# Patient Record
Sex: FEMALE | Race: WHITE | NOT HISPANIC OR LATINO | URBAN - METROPOLITAN AREA
[De-identification: names, ages, dates, MRNs, and addresses within clinical notes are randomized per-mention and may not be internally consistent; named-entity substitution may affect disease eponyms.]

---

## 2017-06-04 ENCOUNTER — EMERGENCY (EMERGENCY)
Facility: HOSPITAL | Age: 32
LOS: 1 days | Discharge: HOME | End: 2017-06-04
Attending: EMERGENCY MEDICINE
Payer: COMMERCIAL

## 2017-06-04 VITALS
SYSTOLIC BLOOD PRESSURE: 114 MMHG | HEIGHT: 63 IN | DIASTOLIC BLOOD PRESSURE: 73 MMHG | RESPIRATION RATE: 16 BRPM | TEMPERATURE: 98 F | HEART RATE: 86 BPM | OXYGEN SATURATION: 100 % | WEIGHT: 149.91 LBS

## 2017-06-04 DIAGNOSIS — T42.4X2A POISONING BY BENZODIAZEPINES, INTENTIONAL SELF-HARM, INITIAL ENCOUNTER: ICD-10-CM

## 2017-06-04 DIAGNOSIS — F43.10 POST-TRAUMATIC STRESS DISORDER, UNSPECIFIED: ICD-10-CM

## 2017-06-04 PROCEDURE — 99284 EMERGENCY DEPT VISIT MOD MDM: CPT | Mod: 25

## 2017-06-04 PROCEDURE — 93010 ELECTROCARDIOGRAM REPORT: CPT

## 2017-06-04 PROCEDURE — 90792 PSYCH DIAG EVAL W/MED SRVCS: CPT | Mod: GT

## 2017-06-04 NOTE — ED PROVIDER NOTE - OBJECTIVE STATEMENT
30 y/o F with hx of lupus, scleroderma, and PTSD presenting to the ED complaining of overdose. She states that recently started therapy for her PTSD and has been reliving a lot of traumatic memories from her childhood. For the past 3 weeks of treatment, pt was reliving the traumatic memories at night. Pt would then be able to go to sleep and not think about traumatic past. Today, pt started to have recollections of the traumatic memories during the day. Pt then "did not want to think about them anymore" so she took 0.5 Clonopin x15 so she could sleep. Pt then woke up and told her friend that she took 15 pills and friend became concerned. Friend then came over to make sure pt was okay around 2000 today. Around 2100, pt then started to have rhinorrhea secondary to allergies so she took benadryl x 2. Pt then started to feel drowsy and EMS was called.     Medication: colonipine  (0.5mg)   Percocet itchy 32 y/o F with hx of lupus, scleroderma, and PTSD presenting to the ED complaining of overdose. She states that recently started therapy for her PTSD and has been reliving a lot of traumatic memories from her childhood. For the past 3 weeks of treatment, pt was reliving the traumatic memories at night. Pt would then be able to go to sleep and not think about traumatic past. Today, pt started to have recollections of the traumatic memories during the day. Pt then "did not want to think about them anymore" so she took 0.5 Clonopin x15 so she could sleep around 1300. Pt then woke up and told her friend that she took 15 pills and friend became concerned. Friend then came over to make sure pt was okay around 2000 today. Around 2100, pt then started to have rhinorrhea secondary to allergies so she took benadryl x 2. Pt then started to feel drowsy and EMS was called.     Medication: colonipine  (0.5mg)   Percocet itchy

## 2017-06-04 NOTE — ED PROVIDER NOTE - NS ED MD SCRIBE ATTENDING SCRIBE SECTIONS
INTAKE ASSESSMENT/SCREENINGS/REVIEW OF SYSTEMS/HISTORY OF PRESENT ILLNESS/PAST MEDICAL/SURGICAL/SOCIAL HISTORY/DISPOSITION/HIV/VITAL SIGNS( Pullset)/PHYSICAL EXAM

## 2017-06-04 NOTE — ED ADULT TRIAGE NOTE - CHIEF COMPLAINT QUOTE
As per EMS pt took 15 pills of Klonopin. Pt awake and alert. empty pill bottle filled 5/24/2017. Pt states she has been having more nightmare. pt denies SI or HI, states she just doesn't want to have these memories. Md called to bedside.

## 2017-06-04 NOTE — ED PROVIDER NOTE - CARE PLAN
Principal Discharge DX:	Drug overdose, intentional Principal Discharge DX:	Drug overdose, intentional  Secondary Diagnosis:	PTSD (post-traumatic stress disorder)

## 2017-06-05 VITALS
TEMPERATURE: 98 F | DIASTOLIC BLOOD PRESSURE: 79 MMHG | SYSTOLIC BLOOD PRESSURE: 129 MMHG | HEART RATE: 91 BPM | RESPIRATION RATE: 20 BRPM | OXYGEN SATURATION: 97 %

## 2017-06-05 DIAGNOSIS — R69 ILLNESS, UNSPECIFIED: ICD-10-CM

## 2017-06-05 DIAGNOSIS — F43.10 POST-TRAUMATIC STRESS DISORDER, UNSPECIFIED: ICD-10-CM

## 2017-06-05 DIAGNOSIS — T42.4X4A POISONING BY BENZODIAZEPINES, UNDETERMINED, INITIAL ENCOUNTER: ICD-10-CM

## 2017-06-05 LAB
ALBUMIN SERPL ELPH-MCNC: 4.2 G/DL — SIGNIFICANT CHANGE UP (ref 3.3–5.2)
ALP SERPL-CCNC: 59 U/L — SIGNIFICANT CHANGE UP (ref 40–120)
ALT FLD-CCNC: 8 U/L — SIGNIFICANT CHANGE UP
AMPHET UR-MCNC: POSITIVE
ANION GAP SERPL CALC-SCNC: 11 MMOL/L — SIGNIFICANT CHANGE UP (ref 5–17)
APAP SERPL-MCNC: <7.5 UG/ML — LOW (ref 10–26)
APPEARANCE UR: ABNORMAL
AST SERPL-CCNC: 12 U/L — SIGNIFICANT CHANGE UP
BACTERIA # UR AUTO: ABNORMAL
BARBITURATES UR SCN-MCNC: NEGATIVE — SIGNIFICANT CHANGE UP
BASOPHILS # BLD AUTO: 0 K/UL — SIGNIFICANT CHANGE UP (ref 0–0.2)
BASOPHILS NFR BLD AUTO: 0.4 % — SIGNIFICANT CHANGE UP (ref 0–2)
BENZODIAZ UR-MCNC: POSITIVE
BILIRUB SERPL-MCNC: 0.2 MG/DL — LOW (ref 0.4–2)
BILIRUB UR-MCNC: NEGATIVE — SIGNIFICANT CHANGE UP
BUN SERPL-MCNC: 10 MG/DL — SIGNIFICANT CHANGE UP (ref 8–20)
CALCIUM SERPL-MCNC: 9 MG/DL — SIGNIFICANT CHANGE UP (ref 8.6–10.2)
CHLORIDE SERPL-SCNC: 102 MMOL/L — SIGNIFICANT CHANGE UP (ref 98–107)
CO2 SERPL-SCNC: 28 MMOL/L — SIGNIFICANT CHANGE UP (ref 22–29)
COCAINE METAB.OTHER UR-MCNC: NEGATIVE — SIGNIFICANT CHANGE UP
COLOR SPEC: YELLOW — SIGNIFICANT CHANGE UP
CREAT SERPL-MCNC: 0.73 MG/DL — SIGNIFICANT CHANGE UP (ref 0.5–1.3)
DIFF PNL FLD: ABNORMAL
EOSINOPHIL # BLD AUTO: 0.5 K/UL — SIGNIFICANT CHANGE UP (ref 0–0.5)
EOSINOPHIL NFR BLD AUTO: 8.1 % — HIGH (ref 0–6)
EPI CELLS # UR: ABNORMAL
ETHANOL SERPL-MCNC: <10 MG/DL — SIGNIFICANT CHANGE UP
GLUCOSE SERPL-MCNC: 86 MG/DL — SIGNIFICANT CHANGE UP (ref 70–115)
GLUCOSE UR QL: NEGATIVE MG/DL — SIGNIFICANT CHANGE UP
HCG UR QL: NEGATIVE — SIGNIFICANT CHANGE UP
HCT VFR BLD CALC: 40 % — SIGNIFICANT CHANGE UP (ref 37–47)
HGB BLD-MCNC: 12.9 G/DL — SIGNIFICANT CHANGE UP (ref 12–16)
KETONES UR-MCNC: NEGATIVE — SIGNIFICANT CHANGE UP
LEUKOCYTE ESTERASE UR-ACNC: ABNORMAL
LYMPHOCYTES # BLD AUTO: 2 K/UL — SIGNIFICANT CHANGE UP (ref 1–4.8)
LYMPHOCYTES # BLD AUTO: 30.2 % — SIGNIFICANT CHANGE UP (ref 20–55)
MCHC RBC-ENTMCNC: 26.1 PG — LOW (ref 27–31)
MCHC RBC-ENTMCNC: 32.3 G/DL — SIGNIFICANT CHANGE UP (ref 32–36)
MCV RBC AUTO: 80.8 FL — LOW (ref 81–99)
METHADONE UR-MCNC: NEGATIVE — SIGNIFICANT CHANGE UP
MONOCYTES # BLD AUTO: 0.6 K/UL — SIGNIFICANT CHANGE UP (ref 0–0.8)
MONOCYTES NFR BLD AUTO: 9.4 % — SIGNIFICANT CHANGE UP (ref 3–10)
NEUTROPHILS # BLD AUTO: 3.4 K/UL — SIGNIFICANT CHANGE UP (ref 1.8–8)
NEUTROPHILS NFR BLD AUTO: 51.6 % — SIGNIFICANT CHANGE UP (ref 37–73)
NITRITE UR-MCNC: NEGATIVE — SIGNIFICANT CHANGE UP
OPIATES UR-MCNC: NEGATIVE — SIGNIFICANT CHANGE UP
PCP SPEC-MCNC: SIGNIFICANT CHANGE UP
PCP UR-MCNC: NEGATIVE — SIGNIFICANT CHANGE UP
PH UR: 5 — SIGNIFICANT CHANGE UP (ref 5–8)
PLATELET # BLD AUTO: 272 K/UL — SIGNIFICANT CHANGE UP (ref 150–400)
POTASSIUM SERPL-MCNC: 3.8 MMOL/L — SIGNIFICANT CHANGE UP (ref 3.5–5.3)
POTASSIUM SERPL-SCNC: 3.8 MMOL/L — SIGNIFICANT CHANGE UP (ref 3.5–5.3)
PROT SERPL-MCNC: 7.4 G/DL — SIGNIFICANT CHANGE UP (ref 6.6–8.7)
PROT UR-MCNC: NEGATIVE MG/DL — SIGNIFICANT CHANGE UP
RBC # BLD: 4.95 M/UL — SIGNIFICANT CHANGE UP (ref 4.4–5.2)
RBC # FLD: 14.1 % — SIGNIFICANT CHANGE UP (ref 11–15.6)
RBC CASTS # UR COMP ASSIST: ABNORMAL /HPF (ref 0–4)
SALICYLATES SERPL-MCNC: <2 MG/DL — LOW (ref 10–20)
SODIUM SERPL-SCNC: 141 MMOL/L — SIGNIFICANT CHANGE UP (ref 135–145)
SP GR SPEC: 1.02 — SIGNIFICANT CHANGE UP (ref 1.01–1.02)
THC UR QL: NEGATIVE — SIGNIFICANT CHANGE UP
UROBILINOGEN FLD QL: NEGATIVE MG/DL — SIGNIFICANT CHANGE UP
WBC # BLD: 6.7 K/UL — SIGNIFICANT CHANGE UP (ref 4.8–10.8)
WBC # FLD AUTO: 6.7 K/UL — SIGNIFICANT CHANGE UP (ref 4.8–10.8)
WBC UR QL: SIGNIFICANT CHANGE UP

## 2017-06-05 PROCEDURE — 99285 EMERGENCY DEPT VISIT HI MDM: CPT

## 2017-06-05 PROCEDURE — 93005 ELECTROCARDIOGRAM TRACING: CPT

## 2017-06-05 PROCEDURE — 81025 URINE PREGNANCY TEST: CPT

## 2017-06-05 PROCEDURE — 85027 COMPLETE CBC AUTOMATED: CPT

## 2017-06-05 PROCEDURE — 80307 DRUG TEST PRSMV CHEM ANLYZR: CPT

## 2017-06-05 PROCEDURE — 80053 COMPREHEN METABOLIC PANEL: CPT

## 2017-06-05 PROCEDURE — 81001 URINALYSIS AUTO W/SCOPE: CPT

## 2017-06-05 NOTE — ED ADULT NURSE REASSESSMENT NOTE - GENERAL PATIENT STATE
comfortable appearance
resting/sleeping/comfortable appearance
comfortable appearance/resting/sleeping/cooperative
cooperative/comfortable appearance/resting/sleeping
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
## 2017-06-05 NOTE — ED BEHAVIORAL HEALTH ASSESSMENT NOTE - OTHER PAST PSYCHIATRIC HISTORY (INCLUDE DETAILS REGARDING ONSET, COURSE OF ILLNESS, INPATIENT/OUTPATIENT TREATMENT)
On adderall x 10 years for ADHD, prescribed by neurologist initially.   Began having symptoms post-traumatic stress disorder two years ago, began with panic attacks.  Two hospitalizations: February 2016 at [facility] for cutting, Second hospitalization December 2016 after texting  suicidal statement in context of feeling overwhelmed with business during Christmas season. States at the time "snapped" and felt like she "just wanted to end it," but would not have done anything and just wanted her  to "realize how stressful this is for [her]." Reports hospitalization was "worst experience of [her] life."   Outpatient treatment with Dr. Cole from [facility], psychiatrist, once a month. Recently started EMDR treatment one month ago with Dr. Miller who reports saw her three times (two sessions focused on coping skills, third session just history, reports has not started the processing yet).   No history of violence or suicide attempts. History of NSSI as above.
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
## 2017-06-05 NOTE — ED BEHAVIORAL HEALTH ASSESSMENT NOTE - DESCRIPTION
Patient reported taking klonopin 0.5mg x 15 and two tablets of benadryl at 21:00. She arrived to ED around 23:38. Of note, patient also arrived with empty bottle of klonopin (received Rx for 90 tablets on 5/24/17). Vital signs were stable. She was described as aware and alert on physical examination. EKG was wnl. Labs significant for utox positive for benzos and amphetamine. Patient was medically cleared. Patient was calm and there were no behavioral incidents. On evaluation, patient was quite drowsy, somewhat slurred speech, talking slowly. Became angry when discussed possible inpatient admission though agreed to plan to hold in ED for morning until can reach outpatient psychiatrist and get reevaluated once medication fully clears. lupus, scleroderma lives with  and two boys ages 5 and 6. Runs a furniture business.

## 2017-06-05 NOTE — ED BEHAVIORAL HEALTH ASSESSMENT NOTE - SUICIDE PROTECTIVE FACTORS
Responsibility to family and others/Engaged in work or school/Positive therapeutic relationships/Supportive social network or family

## 2017-06-05 NOTE — ED ADULT NURSE NOTE - CHPI ED SYMPTOMS NEG
no agitation/no hallucinations/no change in level of consciousness/no weakness/no confusion/no suicidal/no disorientation/no weight loss/no paranoia/no homicidal
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
## 2017-06-05 NOTE — ED BEHAVIORAL HEALTH ASSESSMENT NOTE - DIFFERENTIAL
Benzodiazepine overdose of undetermined intent  post-traumatic stress disorder  Depressive disorder, unspecified  Benzodiazepine use disorder  Borderline personality disorder
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
## 2017-06-05 NOTE — ED ADULT NURSE REASSESSMENT NOTE - NS ED NURSE REASSESS COMMENT FT1
Currently being telepsyched
Patient pleasant on interaction, complaint of feeling tired, no attempts to harm self or others, denies suicidal or homicidal ideation, and safety maintained.  Patient offered breakfast but declined.
pt currently resting tele psych called.
pt currently denies SI/HI denies A/V/T hallucinations.  Pt was tele psyched, patient refusing to be inpatient, tele psychiatrist wants her to be reevaluated in am after they receive collateral from patients psychiatrist.  Awaiting Dispo in AM.  Will continue to monitor and maintain safety.
pt currently resting, in no acute distress at this time, in yellow gowns.  Tele psych will be notified once all lab results are available.  Will continue to monitor and maintain safety.
Assumed care at 0717, resting in bed, cooperative with vital signs assessment, awaiting psychiatric reassessment, and safety maintained.
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
## 2017-12-13 ENCOUNTER — EMERGENCY (EMERGENCY)
Facility: HOSPITAL | Age: 32
LOS: 1 days | Discharge: HOME | End: 2017-12-13
Attending: EMERGENCY MEDICINE
Payer: COMMERCIAL

## 2017-12-13 VITALS
HEART RATE: 90 BPM | TEMPERATURE: 98 F | WEIGHT: 125 LBS | OXYGEN SATURATION: 100 % | SYSTOLIC BLOOD PRESSURE: 112 MMHG | HEIGHT: 63 IN | DIASTOLIC BLOOD PRESSURE: 67 MMHG | RESPIRATION RATE: 18 BRPM

## 2017-12-13 LAB
ALBUMIN SERPL ELPH-MCNC: 4.7 G/DL — SIGNIFICANT CHANGE UP (ref 3.3–5.2)
ALP SERPL-CCNC: 56 U/L — SIGNIFICANT CHANGE UP (ref 40–120)
ALT FLD-CCNC: 9 U/L — SIGNIFICANT CHANGE UP
ANION GAP SERPL CALC-SCNC: 13 MMOL/L — SIGNIFICANT CHANGE UP (ref 5–17)
APAP SERPL-MCNC: <7.5 UG/ML — LOW (ref 10–26)
AST SERPL-CCNC: 14 U/L — SIGNIFICANT CHANGE UP
BASOPHILS # BLD AUTO: 0 K/UL — SIGNIFICANT CHANGE UP (ref 0–0.2)
BASOPHILS NFR BLD AUTO: 0.4 % — SIGNIFICANT CHANGE UP (ref 0–2)
BILIRUB SERPL-MCNC: 0.5 MG/DL — SIGNIFICANT CHANGE UP (ref 0.4–2)
BUN SERPL-MCNC: 13 MG/DL — SIGNIFICANT CHANGE UP (ref 8–20)
CALCIUM SERPL-MCNC: 9.1 MG/DL — SIGNIFICANT CHANGE UP (ref 8.6–10.2)
CHLORIDE SERPL-SCNC: 100 MMOL/L — SIGNIFICANT CHANGE UP (ref 98–107)
CO2 SERPL-SCNC: 25 MMOL/L — SIGNIFICANT CHANGE UP (ref 22–29)
CREAT SERPL-MCNC: 0.65 MG/DL — SIGNIFICANT CHANGE UP (ref 0.5–1.3)
EOSINOPHIL # BLD AUTO: 0.2 K/UL — SIGNIFICANT CHANGE UP (ref 0–0.5)
EOSINOPHIL NFR BLD AUTO: 2.8 % — SIGNIFICANT CHANGE UP (ref 0–6)
ETHANOL SERPL-MCNC: <10 MG/DL — SIGNIFICANT CHANGE UP
GLUCOSE SERPL-MCNC: 85 MG/DL — SIGNIFICANT CHANGE UP (ref 70–115)
HCG SERPL-ACNC: <2 MIU/ML — SIGNIFICANT CHANGE UP
HCT VFR BLD CALC: 40.6 % — SIGNIFICANT CHANGE UP (ref 37–47)
HGB BLD-MCNC: 13.3 G/DL — SIGNIFICANT CHANGE UP (ref 12–16)
LYMPHOCYTES # BLD AUTO: 2.2 K/UL — SIGNIFICANT CHANGE UP (ref 1–4.8)
LYMPHOCYTES # BLD AUTO: 29.5 % — SIGNIFICANT CHANGE UP (ref 20–55)
MCHC RBC-ENTMCNC: 25.6 PG — LOW (ref 27–31)
MCHC RBC-ENTMCNC: 32.8 G/DL — SIGNIFICANT CHANGE UP (ref 32–36)
MCV RBC AUTO: 78.2 FL — LOW (ref 81–99)
MONOCYTES # BLD AUTO: 0.5 K/UL — SIGNIFICANT CHANGE UP (ref 0–0.8)
MONOCYTES NFR BLD AUTO: 6.7 % — SIGNIFICANT CHANGE UP (ref 3–10)
NEUTROPHILS # BLD AUTO: 4.6 K/UL — SIGNIFICANT CHANGE UP (ref 1.8–8)
NEUTROPHILS NFR BLD AUTO: 60.5 % — SIGNIFICANT CHANGE UP (ref 37–73)
PCP SPEC-MCNC: SIGNIFICANT CHANGE UP
PLATELET # BLD AUTO: 249 K/UL — SIGNIFICANT CHANGE UP (ref 150–400)
POTASSIUM SERPL-MCNC: 3.9 MMOL/L — SIGNIFICANT CHANGE UP (ref 3.5–5.3)
POTASSIUM SERPL-SCNC: 3.9 MMOL/L — SIGNIFICANT CHANGE UP (ref 3.5–5.3)
PROT SERPL-MCNC: 8.3 G/DL — SIGNIFICANT CHANGE UP (ref 6.6–8.7)
RBC # BLD: 5.19 M/UL — SIGNIFICANT CHANGE UP (ref 4.4–5.2)
RBC # FLD: 13.4 % — SIGNIFICANT CHANGE UP (ref 11–15.6)
SALICYLATES SERPL-MCNC: <2 MG/DL — LOW (ref 10–20)
SODIUM SERPL-SCNC: 138 MMOL/L — SIGNIFICANT CHANGE UP (ref 135–145)
WBC # BLD: 7.6 K/UL — SIGNIFICANT CHANGE UP (ref 4.8–10.8)
WBC # FLD AUTO: 7.6 K/UL — SIGNIFICANT CHANGE UP (ref 4.8–10.8)

## 2017-12-13 PROCEDURE — 80053 COMPREHEN METABOLIC PANEL: CPT

## 2017-12-13 PROCEDURE — 36415 COLL VENOUS BLD VENIPUNCTURE: CPT

## 2017-12-13 PROCEDURE — 99285 EMERGENCY DEPT VISIT HI MDM: CPT | Mod: 25

## 2017-12-13 PROCEDURE — 80307 DRUG TEST PRSMV CHEM ANLYZR: CPT

## 2017-12-13 PROCEDURE — 84702 CHORIONIC GONADOTROPIN TEST: CPT

## 2017-12-13 PROCEDURE — 93005 ELECTROCARDIOGRAM TRACING: CPT

## 2017-12-13 PROCEDURE — 93010 ELECTROCARDIOGRAM REPORT: CPT

## 2017-12-13 PROCEDURE — 99284 EMERGENCY DEPT VISIT MOD MDM: CPT

## 2017-12-13 PROCEDURE — 85027 COMPLETE CBC AUTOMATED: CPT

## 2017-12-13 RX ORDER — SODIUM CHLORIDE 9 MG/ML
2000 INJECTION INTRAMUSCULAR; INTRAVENOUS; SUBCUTANEOUS ONCE
Qty: 0 | Refills: 0 | Status: COMPLETED | OUTPATIENT
Start: 2017-12-13 | End: 2017-12-13

## 2017-12-13 RX ADMIN — SODIUM CHLORIDE 2000 MILLILITER(S): 9 INJECTION INTRAMUSCULAR; INTRAVENOUS; SUBCUTANEOUS at 22:28

## 2017-12-13 NOTE — ED PROVIDER NOTE - CHPI ED SYMPTOMS NEG
no vomiting/no chills/no abdominal distension/no abdominal pain/no pain/no confusion/no nausea/no weakness/no disorientation/no fever

## 2017-12-13 NOTE — ED PROVIDER NOTE - SKIN, MLM
Skin normal color for race, warm, dry and intact. No evidence of rash, Multiple old healing scars of different stages of healing in B/L wrist. NVI
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
## 2017-12-13 NOTE — ED ADULT NURSE NOTE - OBJECTIVE STATEMENT
pt care assumed at 2220, no apparent distress noted at this time. pt received in yellow gown, Alert and Oriented to person, place, situation and time sitting up in bed upset with  at bedside. Pt states she has h/o PTSD and felt an attack coming on when she decided to take extra Klonopin to prevent getting a "full blown attack." pts usuald daily dose is 3 klonipin per day but she took 7 today. pt states she has taken extra in the past, approved by her therapist. pt states she couldn't walk home from [employer] so she asked for a ride home and that is when her  called an ambulance. Pt states she has a lot of stress at home at this time and the "sad music at [employer] triggered this attack." Pt denies SI or HI. pt has healing lacerations on b/l wrists. Pt states she "cuts herself as a coping mechanism, not as a means to harm herself and her therapist is fully aware." HR is regular, lung sounds are clear b/l, abd is soft and nontender with positive bowel sounds in all four quadrants, skin is warm, dry and appropriate for age and race. plan of care explained, will continue to monitor.

## 2017-12-13 NOTE — ED PROVIDER NOTE - OBJECTIVE STATEMENT
The patient is a 32 year old female presents with taking total of 7 pills of klonopin 0.5 mg for her PTSD.  The patient has long standing PTSD from her childhood and occasionally have nightmares which resulting her to have anxiety attacks.  The patient denies SI nor HI.  No HA, No CP, No ABD Pain.  The patient is mild sleepy but has no respiratory distress.

## 2017-12-13 NOTE — ED ADULT NURSE NOTE - CHIEF COMPLAINT QUOTE
I took 7 Klonopin 0.5mg. Pt states she normally take 3 Klonopin for PTSD. Pt denies SI or HI. noted old healing cuts on right forearm. pt states she does not want to be here and  called the ambulance. Pt belongings removed and placed in locker. `

## 2017-12-13 NOTE — ED ADULT TRIAGE NOTE - CHIEF COMPLAINT QUOTE
I took 7 Klonopin 0.5mg. Pt states she normally take 3 Klonopin for PTSD. Pt denies SI or HI. pt states she does not want to be here and  called the ambulance. Pt belongings removed and placed in locker. ` I took 7 Klonopin 0.5mg. Pt states she normally take 3 Klonopin for PTSD. Pt denies SI or HI. noted old healing cuts on right forearm. pt states she does not want to be here and  called the ambulance. Pt belongings removed and placed in locker. `

## 2017-12-14 LAB
AMPHET UR-MCNC: POSITIVE
BARBITURATES UR SCN-MCNC: NEGATIVE — SIGNIFICANT CHANGE UP
BENZODIAZ UR-MCNC: POSITIVE
COCAINE METAB.OTHER UR-MCNC: NEGATIVE — SIGNIFICANT CHANGE UP
METHADONE UR-MCNC: NEGATIVE — SIGNIFICANT CHANGE UP
OPIATES UR-MCNC: NEGATIVE — SIGNIFICANT CHANGE UP
PCP UR-MCNC: NEGATIVE — SIGNIFICANT CHANGE UP
THC UR QL: NEGATIVE — SIGNIFICANT CHANGE UP

## 2017-12-14 PROCEDURE — 90792 PSYCH DIAG EVAL W/MED SRVCS: CPT | Mod: GT
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
## 2017-12-14 NOTE — ED BEHAVIORAL HEALTH ASSESSMENT NOTE - DESCRIPTION
lupus, scleroderma lives with  and two boys ages 5 and 6. Runs a furniture business. Pt is  calm and cooperative, no restraints or PRN utilized, no issues reported.

## 2017-12-14 NOTE — ED BEHAVIORAL HEALTH ASSESSMENT NOTE - SUICIDE PROTECTIVE FACTORS
Engaged in work or school/Positive therapeutic relationships/Responsibility to family and others/Supportive social network or family Responsibility to family and others/Identifies reasons for living/Future oriented/Engaged in work or school/Supportive social network or family/Positive therapeutic relationships

## 2017-12-14 NOTE — ED BEHAVIORAL HEALTH ASSESSMENT NOTE - CURRENT MEDICATION
Klonopin 0.5mg po TID - last Rx 5/24/17 given 90 tablets  Adderall 30mg po daily - last Rx 5/24/17 Adderall XR 30mg po daily - last Rx 12/1/17 (Austin Reyes MD)  Adderall XR 5 mg PO daily - last rx 12/1/17 (Austin Reyes MD)  Klonopin 0.5mg po TID - last Rx 9/10/17 given 90 tablets (Nikia Chu MD) Albuterol nebulizer as needed shortness of breath, Brio inhaler daily, Ibuprofen as needed   Adderall XR 30mg po daily - last Rx 12/1/17 (Austin Reyes MD)  Adderall XR 5 mg PO daily - last rx 12/1/17 (Austin Reyes MD)  Klonopin 0.5mg po TID - last Rx 9/10/17 given 90 tablets (Nikia Chu MD)

## 2017-12-14 NOTE — ED ADULT NURSE REASSESSMENT NOTE - NS ED NURSE REASSESS COMMENT FT1
pt hesitant to go come into , emotional support and reassurance provided, pt tearful and states she has PTSD.  Pt has superficial scratches on both forearms states she has done this throughout the years not to hurt self but to cope with the pain.  Pt. denies A/V/T and denies SI/HI at this present time.  Pt will be telpsyched.  Will continue to monitor and maintains safety.
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
## 2017-12-14 NOTE — ED BEHAVIORAL HEALTH ASSESSMENT NOTE - OTHER PAST PSYCHIATRIC HISTORY (INCLUDE DETAILS REGARDING ONSET, COURSE OF ILLNESS, INPATIENT/OUTPATIENT TREATMENT)
On adderall x 10 years for ADHD, prescribed by neurologist initially.   Began having symptoms post-traumatic stress disorder two years ago, began with panic attacks.  Two hospitalizations: February 2016 at [facility] for cutting, Second hospitalization December 2016 after texting  suicidal statement in context of feeling overwhelmed with business during Christmas season. States at the time "snapped" and felt like she "just wanted to end it," but would not have done anything and just wanted her  to "realize how stressful this is for [her]." Reports hospitalization was "worst experience of [her] life."   Outpatient treatment with Dr. Cole from [facility], psychiatrist, once a month. Recently started EMDR treatment one month ago with Dr. Miller who reports saw her three times (two sessions focused on coping skills, third session just history, reports has not started the processing yet).   No history of violence or suicide attempts. History of NSSI as above. 1 yr ago - [facility] - was held in Emergency Department for 1 1/2 days, hospitalized "against psychiatrist's request"    On adderall x 10 years for ADHD, prescribed by neurologist initially.   Began having symptoms post-traumatic stress disorder two years ago, began with panic attacks.  Two hospitalizations: February 2016 at [facility] for cutting, Second hospitalization December 2016 after texting  suicidal statement in context of feeling overwhelmed with business during [place] season. States at the time "snapped" and felt like she "just wanted to end it," but would not have done anything and just wanted her  to "realize how stressful this is for [her]." Reports hospitalization was "worst experience of [her] life."   Outpatient treatment with Dr. Cole from [facility], psychiatrist, once a month. Recently started EMDR treatment one month ago with Dr. Miller who reports saw her three times (two sessions focused on coping skills, third session just history, reports has not started the processing yet).   No history of violence or suicide attempts. History of NSSI as above. 1 yr ago - [facility] - was held in Emergency Department for 1 1/2 days, hospitalized "against psychiatrist's request"    was seeing Dr. Romero ([place]) for 2 yrs, dropped because missed an appt due to lack of attendance to appts; now seeing Dr. Reyes in [place] - had seen him in the past, neurologist, stopped EMDR placed on hold (Osvaldo David), nearly complete, on maintenance schedule (every other week) planning on restarting after holidays     On adderall x 10 years for ADHD, prescribed by neurologist initially.   Began having symptoms post-traumatic stress disorder two years ago, began with panic attacks.  Two hospitalizations: February 2016 at [facility] for cutting, Second hospitalization December 2016 after texting  suicidal statement in context of feeling overwhelmed with business during [place] season. States at the time "snapped" and felt like she "just wanted to end it," but would not have done anything and just wanted her  to "realize how stressful this is for [her]." Reports hospitalization was "worst experience of [her] life."   Outpatient treatment with Dr. Cole from [facility], psychiatrist, once a month. Recently started EMDR treatment one month ago with Dr. Miller who reports saw her three times (two sessions focused on coping skills, third session just history, reports has not started the processing yet).   No history of violence or suicide attempts. History of NSSI as above. Began having symptoms post-traumatic stress disorder two years ago, began with panic attacks.  Two hospitalizations: February 2016 at [facility] for cutting, Second hospitalization December 2016 after texting  suicidal statement in context of feeling overwhelmed with business during [place] season. 1 yr ago - [facility] - was held in Emergency Department for 1 1/2 days, hospitalized "against psychiatrist's request"; was seeing Dr. Romero ([place]) for 2 yrs, dropped because missed an appt due to lack of attendance to appts; now seeing Dr. Reyes (Neurologist) in [place] - had seen him in the past stopped EMDR placed on hold (Osvaldo David), nearly complete, now on maintenance schedule (every other week) planning on restarting after holidays; no suicide attempt     BStates at the time "snapped" and felt like she "just wanted to end it," but would not have done anything and just wanted her  to "realize how stressful this is for [her]." Reports hospitalization was "worst experience of [her] life."   Outpatient treatment with Dr. Cole from [facility], psychiatrist, once a month. Recently started EMDR treatment one month ago with Dr. Miller who reports saw her three times (two sessions focused on coping skills, third session just history, reports has not started the processing yet).   No history of violence or suicide attempts. History of NSSI as above. Began having symptoms post-traumatic stress disorder two years ago, began with panic attacks.  Two hospitalizations: February 2016 at [facility] for cutting, Second hospitalization December 2016 after texting  suicidal statement in context of feeling overwhelmed with business during [place] season. 1 yr ago - [facility] - was held in Emergency Department for 1 1/2 days, hospitalized "against psychiatrist's request"; was seeing Dr. Romero ([place]) for 2 yrs, dropped because missed an appt due to lack of attendance to appts; now seeing Dr. Reyes (Neurologist) in [place] - had seen him in the past stopped EMDR placed on hold (Osvaldo David), nearly complete, now on maintenance schedule (every other week) planning on restarting after holidays; no suicide attempt
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
## 2018-04-12 ENCOUNTER — TRANSCRIPTION ENCOUNTER (OUTPATIENT)
Age: 33
End: 2018-04-12

## 2018-04-12 ENCOUNTER — RESULT REVIEW (OUTPATIENT)
Age: 33
End: 2018-04-12

## 2018-04-12 ENCOUNTER — INPATIENT (INPATIENT)
Facility: HOSPITAL | Age: 33
LOS: 0 days | Discharge: HOME | DRG: 777 | End: 2018-04-13
Attending: SPECIALIST | Admitting: SPECIALIST
Payer: COMMERCIAL

## 2018-04-12 VITALS — WEIGHT: 154.98 LBS | HEIGHT: 64 IN

## 2018-04-12 DIAGNOSIS — Z98.890 OTHER SPECIFIED POSTPROCEDURAL STATES: Chronic | ICD-10-CM

## 2018-04-12 DIAGNOSIS — Z98.891 HISTORY OF UTERINE SCAR FROM PREVIOUS SURGERY: Chronic | ICD-10-CM

## 2018-04-12 DIAGNOSIS — O00.90 UNSPECIFIED ECTOPIC PREGNANCY WITHOUT INTRAUTERINE PREGNANCY: ICD-10-CM

## 2018-04-12 LAB
ABO RH CONFIRMATION: SIGNIFICANT CHANGE UP
ALBUMIN SERPL ELPH-MCNC: 4.2 G/DL — SIGNIFICANT CHANGE UP (ref 3.3–5.2)
ALP SERPL-CCNC: 60 U/L — SIGNIFICANT CHANGE UP (ref 40–120)
ALT FLD-CCNC: 9 U/L — SIGNIFICANT CHANGE UP
ANION GAP SERPL CALC-SCNC: 12 MMOL/L — SIGNIFICANT CHANGE UP (ref 5–17)
APPEARANCE UR: CLEAR — SIGNIFICANT CHANGE UP
AST SERPL-CCNC: 15 U/L — SIGNIFICANT CHANGE UP
BACTERIA # UR AUTO: ABNORMAL
BASOPHILS # BLD AUTO: 0 K/UL — SIGNIFICANT CHANGE UP (ref 0–0.2)
BASOPHILS NFR BLD AUTO: 0.3 % — SIGNIFICANT CHANGE UP (ref 0–2)
BILIRUB SERPL-MCNC: 0.4 MG/DL — SIGNIFICANT CHANGE UP (ref 0.4–2)
BILIRUB UR-MCNC: NEGATIVE — SIGNIFICANT CHANGE UP
BLD GP AB SCN SERPL QL: SIGNIFICANT CHANGE UP
BUN SERPL-MCNC: 8 MG/DL — SIGNIFICANT CHANGE UP (ref 8–20)
CALCIUM SERPL-MCNC: 9.1 MG/DL — SIGNIFICANT CHANGE UP (ref 8.6–10.2)
CHLORIDE SERPL-SCNC: 101 MMOL/L — SIGNIFICANT CHANGE UP (ref 98–107)
CO2 SERPL-SCNC: 25 MMOL/L — SIGNIFICANT CHANGE UP (ref 22–29)
COLOR SPEC: YELLOW — SIGNIFICANT CHANGE UP
CREAT SERPL-MCNC: 0.57 MG/DL — SIGNIFICANT CHANGE UP (ref 0.5–1.3)
DIFF PNL FLD: ABNORMAL
EOSINOPHIL # BLD AUTO: 0.3 K/UL — SIGNIFICANT CHANGE UP (ref 0–0.5)
EOSINOPHIL NFR BLD AUTO: 4.2 % — SIGNIFICANT CHANGE UP (ref 0–6)
EPI CELLS # UR: SIGNIFICANT CHANGE UP
GLUCOSE SERPL-MCNC: 85 MG/DL — SIGNIFICANT CHANGE UP (ref 70–115)
GLUCOSE UR QL: NEGATIVE MG/DL — SIGNIFICANT CHANGE UP
HCG SERPL-ACNC: 407.9 MIU/ML — SIGNIFICANT CHANGE UP
HCT VFR BLD CALC: 38.7 % — SIGNIFICANT CHANGE UP (ref 37–47)
HGB BLD-MCNC: 12.4 G/DL — SIGNIFICANT CHANGE UP (ref 12–16)
KETONES UR-MCNC: NEGATIVE — SIGNIFICANT CHANGE UP
LEUKOCYTE ESTERASE UR-ACNC: NEGATIVE — SIGNIFICANT CHANGE UP
LYMPHOCYTES # BLD AUTO: 2.1 K/UL — SIGNIFICANT CHANGE UP (ref 1–4.8)
LYMPHOCYTES # BLD AUTO: 31 % — SIGNIFICANT CHANGE UP (ref 20–55)
MCHC RBC-ENTMCNC: 25.2 PG — LOW (ref 27–31)
MCHC RBC-ENTMCNC: 32 G/DL — SIGNIFICANT CHANGE UP (ref 32–36)
MCV RBC AUTO: 78.7 FL — LOW (ref 81–99)
MONOCYTES # BLD AUTO: 0.6 K/UL — SIGNIFICANT CHANGE UP (ref 0–0.8)
MONOCYTES NFR BLD AUTO: 8.7 % — SIGNIFICANT CHANGE UP (ref 3–10)
NEUTROPHILS # BLD AUTO: 3.7 K/UL — SIGNIFICANT CHANGE UP (ref 1.8–8)
NEUTROPHILS NFR BLD AUTO: 55.6 % — SIGNIFICANT CHANGE UP (ref 37–73)
NITRITE UR-MCNC: NEGATIVE — SIGNIFICANT CHANGE UP
PH UR: 6.5 — SIGNIFICANT CHANGE UP (ref 5–8)
PLATELET # BLD AUTO: 233 K/UL — SIGNIFICANT CHANGE UP (ref 150–400)
POTASSIUM SERPL-MCNC: 3.8 MMOL/L — SIGNIFICANT CHANGE UP (ref 3.5–5.3)
POTASSIUM SERPL-SCNC: 3.8 MMOL/L — SIGNIFICANT CHANGE UP (ref 3.5–5.3)
PROT SERPL-MCNC: 7.4 G/DL — SIGNIFICANT CHANGE UP (ref 6.6–8.7)
PROT UR-MCNC: NEGATIVE MG/DL — SIGNIFICANT CHANGE UP
RBC # BLD: 4.92 M/UL — SIGNIFICANT CHANGE UP (ref 4.4–5.2)
RBC # FLD: 13.8 % — SIGNIFICANT CHANGE UP (ref 11–15.6)
RBC CASTS # UR COMP ASSIST: ABNORMAL /HPF (ref 0–4)
SODIUM SERPL-SCNC: 138 MMOL/L — SIGNIFICANT CHANGE UP (ref 135–145)
SP GR SPEC: 1.01 — SIGNIFICANT CHANGE UP (ref 1.01–1.02)
TYPE + AB SCN PNL BLD: SIGNIFICANT CHANGE UP
UROBILINOGEN FLD QL: NEGATIVE MG/DL — SIGNIFICANT CHANGE UP
WBC # BLD: 6.6 K/UL — SIGNIFICANT CHANGE UP (ref 4.8–10.8)
WBC # FLD AUTO: 6.6 K/UL — SIGNIFICANT CHANGE UP (ref 4.8–10.8)
WBC UR QL: SIGNIFICANT CHANGE UP

## 2018-04-12 PROCEDURE — 59151 TREAT ECTOPIC PREGNANCY: CPT

## 2018-04-12 PROCEDURE — 88305 TISSUE EXAM BY PATHOLOGIST: CPT | Mod: 26

## 2018-04-12 PROCEDURE — 76817 TRANSVAGINAL US OBSTETRIC: CPT | Mod: 26

## 2018-04-12 PROCEDURE — 99285 EMERGENCY DEPT VISIT HI MDM: CPT | Mod: 25

## 2018-04-12 PROCEDURE — 76815 OB US LIMITED FETUS(S): CPT | Mod: 26

## 2018-04-12 RX ORDER — SODIUM CHLORIDE 9 MG/ML
1000 INJECTION, SOLUTION INTRAVENOUS
Qty: 0 | Refills: 0 | Status: DISCONTINUED | OUTPATIENT
Start: 2018-04-12 | End: 2018-04-13

## 2018-04-12 RX ORDER — SODIUM CHLORIDE 9 MG/ML
1000 INJECTION INTRAMUSCULAR; INTRAVENOUS; SUBCUTANEOUS ONCE
Qty: 0 | Refills: 0 | Status: DISCONTINUED | OUTPATIENT
Start: 2018-04-12 | End: 2018-04-13

## 2018-04-12 RX ORDER — SODIUM CHLORIDE 9 MG/ML
3 INJECTION INTRAMUSCULAR; INTRAVENOUS; SUBCUTANEOUS ONCE
Qty: 0 | Refills: 0 | Status: COMPLETED | OUTPATIENT
Start: 2018-04-12 | End: 2018-04-12

## 2018-04-12 RX ORDER — SODIUM CHLORIDE 9 MG/ML
1000 INJECTION, SOLUTION INTRAVENOUS
Qty: 0 | Refills: 0 | Status: DISCONTINUED | OUTPATIENT
Start: 2018-04-13 | End: 2018-04-13

## 2018-04-12 RX ORDER — MORPHINE SULFATE 50 MG/1
2 CAPSULE, EXTENDED RELEASE ORAL
Qty: 0 | Refills: 0 | Status: DISCONTINUED | OUTPATIENT
Start: 2018-04-13 | End: 2018-04-13

## 2018-04-12 NOTE — ED ADULT NURSE REASSESSMENT NOTE - NS ED NURSE REASSESS COMMENT FT1
received in w/c ao colro good skin warm dry resp even unlabored small amt vag bleeding   slight cramping.  iv intact rac dsg intact site clean infusing ns w/o diff.  awaiting sono.   sono called by prior rn awaiting

## 2018-04-12 NOTE — H&P ADULT - ASSESSMENT
33 yo  here with 1 month of vaginal bleeding and beta to 400 with abdominal pain concerning for possible ruptured ectopic vs ruptured right ovarian cyst.   - admit for diagnostic laparoscopy 33 yo  here with 1 month of vaginal bleeding and beta to 400 with abdominal pain concerning for possible ruptured ectopic vs ruptured right ovarian cyst.   - admit for diagnostic laparoscopy  ATTENDING NOTE  33 yo P2 with hx of  x2 presents with complaint of vaginal bleeding for 1 month, RLQ pain x 2 days.  On exam VSS afebrile. Abdomen obese soft positive abdominal tenderness diffuse greater RLQ with rebound no guarding. Pelvic exam deferred as per patient's request.  HCT 38, BHCG 407 Sono 5.6 x2.7 x3.3 cm complex right adnexal mass, complex fluid in the pelvis.  A/p: ruptured ectopic vs ruptured cyst  I explained to the patient given symptom, exam and sono findings ruptured ectopic pregnancy can't be ruled out. I therefore recommend DX laparoscopy, possible unilateral salpingectomy, possible cystectomy, possible unilateral salpingo-oophorectomy possible laparotomy. Risks and benefits of the procedure were explained to the patient.  She was told of risk of infection, bleeding, possible bowel bladder injury, possible blood product transfusion.  All her questions were answered.   She verbalized understanding and consents to the procedure.  CRYSTAL Gallagher MD

## 2018-04-12 NOTE — ED ADULT NURSE NOTE - OBJECTIVE STATEMENT
Pt admitted to ED , sent by OB MD, r/o ectopic.  Pt states yancy 6 wks pregnant. onset rt sided pain assoc with vag bleeding

## 2018-04-12 NOTE — H&P ADULT - NSHPLABSRESULTS_GEN_ALL_CORE
12.4                 138  | 25.0 | 8.0          6.6   >-----------< 233     ------------------------< 85                    38.7                 3.8  | 101  | 0.57                                         Ca 9.1   Mg x     Ph x      HCG Quantitative, Serum: 407.9 mIU/mL (04-12-18 @ 17:08)        INTERPRETATION:  CLINICAL HISTORY: Ectopic, vaginal bleeding    COMPARISON: None.    TECHNIQUE: Grayscale color and Doppler examination of the pelvis,   transabdominally and transvaginally.    FINDINGS:    Sonographic evaluation of the pelvis shows the uterus to measure 8.0 x   4.0 x 5.1 cm. Mild fluid in the endometrial cavity. No IUP.    Right ovary measures 3.3 x 2.1 x 3.0 cm. Complex cyst with debris   measures 2.5 cm. Complex right adnexal mass measuring 5.6 x 2.7 3.3 cm   suspicious for ectopic pregnancy. Complex fluid in the pelvis suggesting   hemorrhage.    Left ovary measures 3.0x 2.3 x 2.4 cm. Left adnexal cyst measures 1.2 cm.    IMPRESSION:    Findings highly suspicious for a ruptured right adnexal ectopic   pregnancy. Findings discussed with Dr. Romero.                MAIRA BENITES M.D., ATTENDING RADIOLOGIST  This document has been electronically signed. Apr 12 2018  8:50PM

## 2018-04-12 NOTE — H&P ADULT - HISTORY OF PRESENT ILLNESS
32y yo  presents with concern for ruptured ectopic pregnancy. She has been bleeding for 30 days. She has irregular cycles on withdrawal progesterone. She went to her Gyn in [place] who found a + beta hCG and did a sono that was concerning for possible right ruptured ectopic pregnancy.  She last ate or drank at 11:30 am    Obhx: CSx2

## 2018-04-12 NOTE — ED PROVIDER NOTE - PROGRESS NOTE DETAILS
pt was seen by gyn and had a sono  which shows a ruptured ectopic pregnancy. pt currently hemodynamically stable and will be admitted to gyn
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
## 2018-04-12 NOTE — ED ADULT TRIAGE NOTE - CHIEF COMPLAINT QUOTE
pt states that she was sent from her OB/GYN office to ed for evaluation of possible ectopic pregnancy

## 2018-04-12 NOTE — H&P ADULT - NSHPPHYSICALEXAM_GEN_ALL_CORE
Gen: well appearing in NAD  Abd: moderately tender to palpation on right side, no rebound, no guarding, no distention Gen: well appearing in NAD  Abd: moderately tender to palpation on right side, mild rebound, no guarding, no distention

## 2018-04-12 NOTE — ED PROVIDER NOTE - OBJECTIVE STATEMENT
32 year old  LNMP 6 weeks ago presents wit abdominal pain and vaginal bleeding. Pt went to see her GYN  doctor today and she had a sono which showed a possible ectopic pregnancy.

## 2018-04-13 VITALS
HEART RATE: 89 BPM | DIASTOLIC BLOOD PRESSURE: 64 MMHG | OXYGEN SATURATION: 98 % | SYSTOLIC BLOOD PRESSURE: 104 MMHG | RESPIRATION RATE: 15 BRPM

## 2018-04-13 PROCEDURE — 36415 COLL VENOUS BLD VENIPUNCTURE: CPT

## 2018-04-13 PROCEDURE — 76817 TRANSVAGINAL US OBSTETRIC: CPT

## 2018-04-13 PROCEDURE — 84702 CHORIONIC GONADOTROPIN TEST: CPT

## 2018-04-13 PROCEDURE — 76802 OB US < 14 WKS ADDL FETUS: CPT

## 2018-04-13 PROCEDURE — 86901 BLOOD TYPING SEROLOGIC RH(D): CPT

## 2018-04-13 PROCEDURE — 88305 TISSUE EXAM BY PATHOLOGIST: CPT

## 2018-04-13 PROCEDURE — 81001 URINALYSIS AUTO W/SCOPE: CPT

## 2018-04-13 PROCEDURE — 86850 RBC ANTIBODY SCREEN: CPT

## 2018-04-13 PROCEDURE — 80053 COMPREHEN METABOLIC PANEL: CPT

## 2018-04-13 PROCEDURE — 85027 COMPLETE CBC AUTOMATED: CPT

## 2018-04-13 PROCEDURE — 99285 EMERGENCY DEPT VISIT HI MDM: CPT

## 2018-04-13 PROCEDURE — 86900 BLOOD TYPING SEROLOGIC ABO: CPT

## 2018-04-13 RX ORDER — ONDANSETRON 8 MG/1
4 TABLET, FILM COATED ORAL ONCE
Qty: 0 | Refills: 0 | Status: COMPLETED | OUTPATIENT
Start: 2018-04-13 | End: 2018-04-13

## 2018-04-13 RX ORDER — IBUPROFEN 200 MG
1 TABLET ORAL
Qty: 28 | Refills: 0 | OUTPATIENT
Start: 2018-04-13 | End: 2018-04-19

## 2018-04-13 RX ORDER — SODIUM CHLORIDE 9 MG/ML
1000 INJECTION, SOLUTION INTRAVENOUS
Qty: 0 | Refills: 0 | Status: DISCONTINUED | OUTPATIENT
Start: 2018-04-13 | End: 2018-04-13

## 2018-04-13 RX ADMIN — MORPHINE SULFATE 2 MILLIGRAM(S): 50 CAPSULE, EXTENDED RELEASE ORAL at 01:35

## 2018-04-13 RX ADMIN — ONDANSETRON 4 MILLIGRAM(S): 8 TABLET, FILM COATED ORAL at 02:30

## 2018-04-13 RX ADMIN — MORPHINE SULFATE 2 MILLIGRAM(S): 50 CAPSULE, EXTENDED RELEASE ORAL at 01:50

## 2018-04-13 RX ADMIN — MORPHINE SULFATE 2 MILLIGRAM(S): 50 CAPSULE, EXTENDED RELEASE ORAL at 02:10

## 2018-04-13 RX ADMIN — MORPHINE SULFATE 2 MILLIGRAM(S): 50 CAPSULE, EXTENDED RELEASE ORAL at 01:54

## 2018-04-13 NOTE — ASU DISCHARGE PLAN (ADULT/PEDIATRIC). - ACTIVITY LEVEL
no douching/2 wks/no heavy lifting/no intercourse/nothing per vagina/no tub baths 2 wks/no tampons/no heavy lifting/no tub baths/no douching/nothing per vagina/no intercourse

## 2018-04-13 NOTE — BRIEF OPERATIVE NOTE - PROCEDURE
<<-----Click on this checkbox to enter Procedure Salpingectomy for ectopic pregnancy  04/13/2018  Right laparoscopic salpingectomy  Active  RANTILUS  Diagnostic laparoscopy by gynecology  04/13/2018    Active  DONTE

## 2018-04-13 NOTE — ASU DISCHARGE PLAN (ADULT/PEDIATRIC). - MEDICATION SUMMARY - MEDICATIONS TO TAKE
I will START or STAY ON the medications listed below when I get home from the hospital:    ibuprofen 600 mg oral tablet  -- 1 tab(s) by mouth every 6 hours   -- Do not take this drug if you are pregnant.  It is very important that you take or use this exactly as directed.  Do not skip doses or discontinue unless directed by your doctor.  May cause drowsiness or dizziness.  Obtain medical advice before taking any non-prescription drugs as some may affect the action of this medication.  Take with food or milk.    -- Indication: For Ruptured ectopic pregnancy

## 2018-04-18 LAB — SURGICAL PATHOLOGY FINAL REPORT - CH: SIGNIFICANT CHANGE UP

## 2018-04-24 PROBLEM — D89.89 OTHER SPECIFIED DISORDERS INVOLVING THE IMMUNE MECHANISM, NOT ELSEWHERE CLASSIFIED: Chronic | Status: ACTIVE | Noted: 2018-04-12

## 2018-04-26 ENCOUNTER — APPOINTMENT (OUTPATIENT)
Dept: OBGYN | Facility: CLINIC | Age: 33
End: 2018-04-26
Payer: COMMERCIAL

## 2018-04-26 VITALS
HEIGHT: 64 IN | SYSTOLIC BLOOD PRESSURE: 115 MMHG | DIASTOLIC BLOOD PRESSURE: 89 MMHG | WEIGHT: 159 LBS | BODY MASS INDEX: 27.14 KG/M2

## 2018-04-26 DIAGNOSIS — F32.9 ANXIETY DISORDER, UNSPECIFIED: ICD-10-CM

## 2018-04-26 DIAGNOSIS — M32.9 SYSTEMIC LUPUS ERYTHEMATOSUS, UNSPECIFIED: ICD-10-CM

## 2018-04-26 DIAGNOSIS — Z84.2 FAMILY HISTORY OF OTHER DISEASES OF THE GENITOURINARY SYSTEM: ICD-10-CM

## 2018-04-26 DIAGNOSIS — Z78.9 OTHER SPECIFIED HEALTH STATUS: ICD-10-CM

## 2018-04-26 DIAGNOSIS — Z98.890 OTHER SPECIFIED POSTPROCEDURAL STATES: ICD-10-CM

## 2018-04-26 DIAGNOSIS — Z82.49 FAMILY HISTORY OF ISCHEMIC HEART DISEASE AND OTHER DISEASES OF THE CIRCULATORY SYSTEM: ICD-10-CM

## 2018-04-26 DIAGNOSIS — F41.9 ANXIETY DISORDER, UNSPECIFIED: ICD-10-CM

## 2018-04-26 DIAGNOSIS — Z80.49 FAMILY HISTORY OF MALIGNANT NEOPLASM OF OTHER GENITAL ORGANS: ICD-10-CM

## 2018-04-26 DIAGNOSIS — F90.9 ATTENTION-DEFICIT HYPERACTIVITY DISORDER, UNSPECIFIED TYPE: ICD-10-CM

## 2018-04-26 LAB
HCG UR QL: NEGATIVE
QUALITY CONTROL: YES

## 2018-04-26 PROCEDURE — 99024 POSTOP FOLLOW-UP VISIT: CPT

## 2018-04-26 PROCEDURE — 81025 URINE PREGNANCY TEST: CPT

## 2018-04-26 RX ORDER — DEXTROAMPHETAMINE SULFATE, DEXTROAMPHETAMINE SACCHARATE, AMPHETAMINE SULFATE AND AMPHETAMINE ASPARTATE 7.5; 7.5; 7.5; 7.5 MG/1; MG/1; MG/1; MG/1
30 CAPSULE, EXTENDED RELEASE ORAL
Qty: 30 | Refills: 0 | Status: ACTIVE | COMMUNITY
Start: 2017-12-01

## 2018-04-26 RX ORDER — AZITHROMYCIN 250 MG/1
250 TABLET, FILM COATED ORAL
Qty: 6 | Refills: 0 | Status: COMPLETED | COMMUNITY
Start: 2017-12-19

## 2018-04-26 RX ORDER — PROGESTERONE 200 MG/1
200 CAPSULE ORAL
Qty: 10 | Refills: 0 | Status: ACTIVE | COMMUNITY
Start: 2017-05-09

## 2018-04-26 RX ORDER — FLUCONAZOLE 150 MG/1
150 TABLET ORAL
Qty: 1 | Refills: 0 | Status: COMPLETED | COMMUNITY
Start: 2017-12-19

## 2018-04-26 RX ORDER — IBUPROFEN 600 MG/1
600 TABLET, FILM COATED ORAL
Qty: 28 | Refills: 0 | Status: COMPLETED | COMMUNITY
Start: 2018-04-13

## 2018-04-26 RX ORDER — DEXTROAMPHETAMINE SULFATE, DEXTROAMPHETAMINE SACCHARATE, AMPHETAMINE SULFATE AND AMPHETAMINE ASPARTATE 1.25; 1.25; 1.25; 1.25 MG/1; MG/1; MG/1; MG/1
5 CAPSULE, EXTENDED RELEASE ORAL
Qty: 30 | Refills: 0 | Status: ACTIVE | COMMUNITY
Start: 2017-12-01

## 2018-04-26 RX ORDER — FLUTICASONE PROPIONATE 50 UG/1
50 SPRAY, METERED NASAL
Qty: 16 | Refills: 0 | Status: COMPLETED | COMMUNITY
Start: 2017-12-19

## 2018-12-24 PROBLEM — M32.9 LUPUS: Status: ACTIVE | Noted: 2018-04-26

## 2020-01-10 ENCOUNTER — EMERGENCY (EMERGENCY)
Facility: HOSPITAL | Age: 35
LOS: 1 days | Discharge: HOME | End: 2020-01-10
Attending: EMERGENCY MEDICINE
Payer: COMMERCIAL

## 2020-01-10 VITALS
OXYGEN SATURATION: 100 % | TEMPERATURE: 99 F | HEART RATE: 100 BPM | WEIGHT: 160.06 LBS | RESPIRATION RATE: 20 BRPM | SYSTOLIC BLOOD PRESSURE: 125 MMHG | DIASTOLIC BLOOD PRESSURE: 84 MMHG | HEIGHT: 64 IN

## 2020-01-10 DIAGNOSIS — Z98.890 OTHER SPECIFIED POSTPROCEDURAL STATES: Chronic | ICD-10-CM

## 2020-01-10 DIAGNOSIS — Z98.891 HISTORY OF UTERINE SCAR FROM PREVIOUS SURGERY: Chronic | ICD-10-CM

## 2020-01-10 PROBLEM — F43.10 POST-TRAUMATIC STRESS DISORDER, UNSPECIFIED: Chronic | Status: ACTIVE | Noted: 2017-12-13

## 2020-01-10 LAB
ALBUMIN SERPL ELPH-MCNC: 4.1 G/DL — SIGNIFICANT CHANGE UP (ref 3.3–5.2)
ALP SERPL-CCNC: 60 U/L — SIGNIFICANT CHANGE UP (ref 40–120)
ALT FLD-CCNC: 7 U/L — SIGNIFICANT CHANGE UP
ANION GAP SERPL CALC-SCNC: 13 MMOL/L — SIGNIFICANT CHANGE UP (ref 5–17)
APPEARANCE UR: CLEAR — SIGNIFICANT CHANGE UP
AST SERPL-CCNC: 12 U/L — SIGNIFICANT CHANGE UP
BACTERIA # UR AUTO: NEGATIVE — SIGNIFICANT CHANGE UP
BASOPHILS # BLD AUTO: 0.03 K/UL — SIGNIFICANT CHANGE UP (ref 0–0.2)
BASOPHILS NFR BLD AUTO: 0.4 % — SIGNIFICANT CHANGE UP (ref 0–2)
BILIRUB SERPL-MCNC: <0.2 MG/DL — LOW (ref 0.4–2)
BILIRUB UR-MCNC: NEGATIVE — SIGNIFICANT CHANGE UP
BUN SERPL-MCNC: 7 MG/DL — LOW (ref 8–20)
CALCIUM SERPL-MCNC: 8.8 MG/DL — SIGNIFICANT CHANGE UP (ref 8.6–10.2)
CHLORIDE SERPL-SCNC: 101 MMOL/L — SIGNIFICANT CHANGE UP (ref 98–107)
CO2 SERPL-SCNC: 23 MMOL/L — SIGNIFICANT CHANGE UP (ref 22–29)
COLOR SPEC: YELLOW — SIGNIFICANT CHANGE UP
CREAT SERPL-MCNC: 0.54 MG/DL — SIGNIFICANT CHANGE UP (ref 0.5–1.3)
DIFF PNL FLD: ABNORMAL
EOSINOPHIL # BLD AUTO: 0.32 K/UL — SIGNIFICANT CHANGE UP (ref 0–0.5)
EOSINOPHIL NFR BLD AUTO: 4.6 % — SIGNIFICANT CHANGE UP (ref 0–6)
EPI CELLS # UR: SIGNIFICANT CHANGE UP
GLUCOSE SERPL-MCNC: 82 MG/DL — SIGNIFICANT CHANGE UP (ref 70–115)
GLUCOSE UR QL: NEGATIVE MG/DL — SIGNIFICANT CHANGE UP
HCG SERPL-ACNC: <4 MIU/ML — SIGNIFICANT CHANGE UP
HCT VFR BLD CALC: 41.8 % — SIGNIFICANT CHANGE UP (ref 34.5–45)
HGB BLD-MCNC: 13 G/DL — SIGNIFICANT CHANGE UP (ref 11.5–15.5)
IMM GRANULOCYTES NFR BLD AUTO: 0.4 % — SIGNIFICANT CHANGE UP (ref 0–1.5)
KETONES UR-MCNC: NEGATIVE — SIGNIFICANT CHANGE UP
LEUKOCYTE ESTERASE UR-ACNC: NEGATIVE — SIGNIFICANT CHANGE UP
LIDOCAIN IGE QN: 22 U/L — SIGNIFICANT CHANGE UP (ref 22–51)
LYMPHOCYTES # BLD AUTO: 1.68 K/UL — SIGNIFICANT CHANGE UP (ref 1–3.3)
LYMPHOCYTES # BLD AUTO: 24.3 % — SIGNIFICANT CHANGE UP (ref 13–44)
MCHC RBC-ENTMCNC: 24.3 PG — LOW (ref 27–34)
MCHC RBC-ENTMCNC: 31.1 GM/DL — LOW (ref 32–36)
MCV RBC AUTO: 78.1 FL — LOW (ref 80–100)
MONOCYTES # BLD AUTO: 0.56 K/UL — SIGNIFICANT CHANGE UP (ref 0–0.9)
MONOCYTES NFR BLD AUTO: 8.1 % — SIGNIFICANT CHANGE UP (ref 2–14)
NEUTROPHILS # BLD AUTO: 4.3 K/UL — SIGNIFICANT CHANGE UP (ref 1.8–7.4)
NEUTROPHILS NFR BLD AUTO: 62.2 % — SIGNIFICANT CHANGE UP (ref 43–77)
NITRITE UR-MCNC: NEGATIVE — SIGNIFICANT CHANGE UP
PH UR: 7 — SIGNIFICANT CHANGE UP (ref 5–8)
PLATELET # BLD AUTO: 295 K/UL — SIGNIFICANT CHANGE UP (ref 150–400)
POTASSIUM SERPL-MCNC: 4.4 MMOL/L — SIGNIFICANT CHANGE UP (ref 3.5–5.3)
POTASSIUM SERPL-SCNC: 4.4 MMOL/L — SIGNIFICANT CHANGE UP (ref 3.5–5.3)
PROT SERPL-MCNC: 8 G/DL — SIGNIFICANT CHANGE UP (ref 6.6–8.7)
PROT UR-MCNC: NEGATIVE MG/DL — SIGNIFICANT CHANGE UP
RBC # BLD: 5.35 M/UL — HIGH (ref 3.8–5.2)
RBC # FLD: 13.2 % — SIGNIFICANT CHANGE UP (ref 10.3–14.5)
RBC CASTS # UR COMP ASSIST: SIGNIFICANT CHANGE UP /HPF (ref 0–4)
SODIUM SERPL-SCNC: 137 MMOL/L — SIGNIFICANT CHANGE UP (ref 135–145)
SP GR SPEC: 1 — LOW (ref 1.01–1.02)
UROBILINOGEN FLD QL: NEGATIVE MG/DL — SIGNIFICANT CHANGE UP
WBC # BLD: 6.92 K/UL — SIGNIFICANT CHANGE UP (ref 3.8–10.5)
WBC # FLD AUTO: 6.92 K/UL — SIGNIFICANT CHANGE UP (ref 3.8–10.5)
WBC UR QL: SIGNIFICANT CHANGE UP

## 2020-01-10 PROCEDURE — 74177 CT ABD & PELVIS W/CONTRAST: CPT

## 2020-01-10 PROCEDURE — 85027 COMPLETE CBC AUTOMATED: CPT

## 2020-01-10 PROCEDURE — 74177 CT ABD & PELVIS W/CONTRAST: CPT | Mod: 26

## 2020-01-10 PROCEDURE — 84702 CHORIONIC GONADOTROPIN TEST: CPT

## 2020-01-10 PROCEDURE — 99284 EMERGENCY DEPT VISIT MOD MDM: CPT | Mod: 25

## 2020-01-10 PROCEDURE — 81001 URINALYSIS AUTO W/SCOPE: CPT

## 2020-01-10 PROCEDURE — 83605 ASSAY OF LACTIC ACID: CPT

## 2020-01-10 PROCEDURE — 87086 URINE CULTURE/COLONY COUNT: CPT

## 2020-01-10 PROCEDURE — 80053 COMPREHEN METABOLIC PANEL: CPT

## 2020-01-10 PROCEDURE — 36415 COLL VENOUS BLD VENIPUNCTURE: CPT

## 2020-01-10 PROCEDURE — 83690 ASSAY OF LIPASE: CPT

## 2020-01-10 PROCEDURE — 99284 EMERGENCY DEPT VISIT MOD MDM: CPT

## 2020-01-10 RX ORDER — SODIUM CHLORIDE 9 MG/ML
1000 INJECTION INTRAMUSCULAR; INTRAVENOUS; SUBCUTANEOUS ONCE
Refills: 0 | Status: COMPLETED | OUTPATIENT
Start: 2020-01-10 | End: 2020-01-10

## 2020-01-10 RX ORDER — ONDANSETRON 8 MG/1
1 TABLET, FILM COATED ORAL
Qty: 15 | Refills: 0
Start: 2020-01-10 | End: 2020-01-14

## 2020-01-10 RX ORDER — MOXIFLOXACIN HYDROCHLORIDE TABLETS, 400 MG 400 MG/1
1 TABLET, FILM COATED ORAL
Qty: 20 | Refills: 0
Start: 2020-01-10 | End: 2020-01-19

## 2020-01-10 RX ORDER — ONDANSETRON 8 MG/1
4 TABLET, FILM COATED ORAL ONCE
Refills: 0 | Status: DISCONTINUED | OUTPATIENT
Start: 2020-01-10 | End: 2020-02-02

## 2020-01-10 RX ORDER — METRONIDAZOLE 500 MG
1 TABLET ORAL
Qty: 30 | Refills: 0
Start: 2020-01-10 | End: 2020-01-19

## 2020-01-10 RX ADMIN — SODIUM CHLORIDE 1000 MILLILITER(S): 9 INJECTION INTRAMUSCULAR; INTRAVENOUS; SUBCUTANEOUS at 11:44

## 2020-01-10 NOTE — ED PROVIDER NOTE - NSFOLLOWUPINSTRUCTIONS_ED_ALL_ED_FT
Pneumonia    Pneumonia is an infection of the lungs. Pneumonia may be caused by bacteria, viruses, or funguses. Symptoms include coughing, fever, chest pain when breathing deeply or coughing, shortness of breath, fatigue, or muscle aches. Pneumonia can be diagnosed with a medical history and physical exam, as well as other tests which may include a chest X-ray. If you were prescribed an antibiotic medicine, take it as told by your health care provider and do not stop taking the antibiotic even if you start to feel better. Do not use tobacco products, including cigarettes, chewing tobacco, and e-cigarettes.    SEEK IMMEDIATE MEDICAL CARE IF YOU HAVE ANY OF THE FOLLOWING SYMPTOMS: worsening shortness of breath, worsening chest pain, coughing up blood, change in mental status, lightheadedness/dizziness.     Diverticulitis    Diverticulitis is inflammation or infection of small pouches in your colon that form when you HAVE a condition called diverticulosis. This condition can range from mild to severe potentially leading to perforation or obstructions of your colon. Symptoms include abdominal pain, fever/chills, nausea, vomiting, diarrhea, constipation, or blood in your stool. If you were prescribed an antibiotic medicine, take it as told by your health care provider. Do not stop taking the antibiotic even if you start to feel better.    SEEK IMMEDIATE MEDICAL CARE IF YOU HAVE ANY OF THE FOLLOWING SYMPTOMS: worsening abdominal pain, high fever, inability to hold down liquids or medication, black or bloody stools, inability to pass gas, lightheadedness/dizziness, or a change in mental status.

## 2020-01-10 NOTE — ED PROVIDER NOTE - OBJECTIVE STATEMENT
Pertinent PMH/PSH/FHx/SHx and Review of Systems contained within:  Patient presents to the ED for LLQ/midline suprapubic pain with hematuria on outpatient UA for 2 days.  Hx of the same one month ago.  VSS.  PSH of cholecystecomy tiwht bile duct revision.  No other concerns.  no trauma.  Non toxic.  Well appearing. No aggravating or relieving factors. No other pertinent PMH.  No other pertinent PSH.  No other pertinent FHx.  Patient denies EtOH/tobacco/illicit substance use. No fever/chills, No photophobia/eye pain/changes in vision, No ear pain/sore throat/dysphagia, No chest pain/palpitations, no SOB/cough/wheeze/stridor, No N/V/D, no dysuria/frequency/discharge, No neck/back pain, no rash, no changes in neurological status/function.

## 2020-01-10 NOTE — ED PROVIDER NOTE - PHYSICAL EXAMINATION
Gen: Alert, NAD  Head: NC, AT, PERRL, EOMI, normal lids/conjunctiva  ENT: normal hearing, patent oropharynx without erythema/exudate, uvula midline  Neck: +supple, no tenderness/meningismus/JVD, +Trachea midline  Pulm: Bilateral BS, normal resp effort, no wheeze/stridor/retractions  CV: RRR, no M/R/G, +dist pulses  Abd: soft, mild LLQ and midline TTP, otherwise NT/ND, +BS, no hepatosplenomegaly  Mskel: no edema/erythema/cyanosis  Skin: no rash  Neuro: AAOx3, no gross sensory/motor deficits,  : deferred

## 2020-01-10 NOTE — ED PROVIDER NOTE - PROGRESS NOTE DETAILS
PT evaluated by intake physician. HPI/PE/ROS as noted above. Will follow up plan per intake physician.

## 2020-01-10 NOTE — ED PROVIDER NOTE - CLINICAL SUMMARY MEDICAL DECISION MAKING FREE TEXT BOX
Patient presnets with midline abd pain.  CT with diverticulitis and incidental PNA.  will treat.  Lab values do not require emergent intervention. Uneventful ED observation period. Non toxic.  Well appearing. Patient given prescription medications for their condition and advised to take them as prescribed and check with their Primary Care Provider if any questions arise. Discussed results and outcome of testing with the patient.  Patient advised to please follow up with their primary care doctor within the next 24 hours and return to the Emergency Department for worsening symptoms or any other concerns.  Patient advised that their doctor may call  to follow up on the specific results of the tests performed today in the emergency department.

## 2020-01-10 NOTE — ED PROVIDER NOTE - CARE PROVIDER_API CALL
Ruslan Howell)  Gastroenterology; Internal Medicine  [address] [place] [address], [place] [address]  Phone: [phone]  Fax: [phone]  Follow Up Time:

## 2020-01-10 NOTE — ED PROVIDER NOTE - PATIENT PORTAL LINK FT
You can access the FollowMyHealth Patient Portal offered by [facility] by registering at the following website: [email or web address]. By joining [facility]’s FollowMyHealth portal, you will also be able to view your health information using other applications (apps) compatible with our system.

## 2020-01-12 LAB
CULTURE RESULTS: SIGNIFICANT CHANGE UP
SPECIMEN SOURCE: SIGNIFICANT CHANGE UP

## 2020-03-01 NOTE — ED ADULT NURSE NOTE - PSH
[facility] Transitional Care Coordination  Referral from:  Mariela Haynes MD  Facesheet indicates: Medicare /      Potential Rehab Diagnosis: Debility  Lumbar stenosis with lumbar radiculopathy.  Pt is s/p L5-S1 laminectomy with discectomy on 2/25/2020; she was hospitalized 2/25 to 2/26.      Chart review indicates patient does have on going medical management and may have therapy needs to possibly meet inpatient rehab facility criteria with the goal of returning to community.  PT/OT to evaluate.     D/C support: Pt lives in [place] w/ her dtr and son in law. 2SH w/ 1 step to enter. Walk in shower.  Pt stays on first floor-does not need to negotiate flight of stairs.  DME at home:  4ww, shower chair, raised toilet seat w/ arms.        Physiatry consultation Pended per protocol --- therapy notes to be reviewed      Will continue to monitor.   Thank you for the referral.         H/O  section

## 2020-06-17 NOTE — ED ADULT TRIAGE NOTE - BMI (KG/M2)
Anesthesia Pain Management Service- Attending Addendum    SUBJECTIVE: Pt doing well with IV PCA without problems reported.    Therapy:	  [ X] IV PCA	   [ ] Epidural           [ ] s/p Spinal Opoid              [ ] Postpartum infusion	  [ ] Patient controlled regional anesthesia (PCRA)    [ ] prn Analgesics    Allergies    No Known Allergies    Intolerances      MEDICATIONS  (STANDING):  acetaminophen   Tablet .. 650 milliGRAM(s) Oral every 6 hours  chlorhexidine 4% Liquid 1 Application(s) Topical <User Schedule>  gabapentin 100 milliGRAM(s) Oral three times a day  heparin   Injectable 5000 Unit(s) SubCutaneous every 8 hours  HYDROmorphone  Injectable 2 milliGRAM(s) IV Push daily  HYDROmorphone  Injectable 1 milliGRAM(s) IV Push once  niCARdipine 30 milliGRAM(s) Oral every 8 hours  polyethylene glycol 3350 17 Gram(s) Oral two times a day  senna 2 Tablet(s) Oral at bedtime  sevelamer carbonate 800 milliGRAM(s) Oral every 8 hours    MEDICATIONS  (PRN):  ALPRAZolam 1 milliGRAM(s) Oral three times a day PRN Anxiety  HYDROmorphone  Injectable 1 milliGRAM(s) IV Push every 3 hours PRN Severe Pain Break Through  oxyCODONE    IR 15 milliGRAM(s) Oral every 3 hours PRN Severe Pain (7 - 10)  oxyCODONE    IR 10 milliGRAM(s) Oral every 3 hours PRN Moderate Pain (4 - 6)  sodium chloride 0.9% lock flush 10 milliLiter(s) IV Push every 1 hour PRN Pre/post blood products, medications, blood draw, and to maintain line patency      OBJECTIVE:   [X] No new signs     [ ] Other:    Side Effects:  [X ] None			[ ] Other:    Assessment of Catheter Site:		[ ] Intact		[ ] Other:    ASSESSMENT/PLAN  [ X] Continue current therapy    [ ] Therapy changed to:    [ ] IV PCA       [ ] Epidural     [ ] prn Analgesics     Comments: 27.5

## 2021-09-14 NOTE — ED BEHAVIORAL HEALTH ASSESSMENT NOTE - DESCRIPTION (FIRST USE, LAST USE, QUANTITY, FREQUENCY, DURATION)
Script denied.  Pt needs in office visit with me prior to refills.    Yolande Fine, DO     1-2 glasses of wine per month

## 2021-09-16 NOTE — ED PROVIDER NOTE - MEDICAL DECISION MAKING DETAILS
GREGG fluticasone (FLONASE) 50 MCG/ACT nasal spray Spray 1 spray in each nostril daily. 48 g 5   • lisinopril-hydroCHLOROthiazide (ZESTORETIC) 20-12.5 MG per tablet Take 1 tablet by mouth daily. 90 tablet 1   • simvastatin (ZOCOR) 40 MG tablet Take 1 tablet by mouth nightly. 90 tablet 1   • metformin (GLUCOPHAGE) 1000 MG tablet Take 1 tablet by mouth 2 times daily (with meals). 180 tablet 1   • blood glucose (OneTouch Verio) test strip Test Blood Sugar once daily Dx E11.9 NPI [id number] length of need 99 100 strip 1   • blood glucose meter Test blood sugar once daily. Diagnosis: E11.9 Meter: NPI [id number] length of need 99  Pharmacist choice per insurance 1 kit 0   • DISPENSE Lancets for testing blood sugars once daily as directed. 100 each 0   • Cyanocobalamin (VITAMIN B 12) 250 MCG LOZG Take 1 lozenge by mouth daily. 30 lozenge 11   • pantoprazole (PROTONIX) 40 MG tablet Take 1 tablet by mouth 2 times daily. 180 tablet 3     No current facility-administered medications for this visit.     Social History     Tobacco Use   • Smoking status: Former Smoker     Packs/day: 1.00     Years: 20.00     Pack years: 20.00     Types: Cigarettes     Quit date: 2014     Years since quittin.1   • Smokeless tobacco: Never Used   Substance Use Topics   • Alcohol use: Not Currently     Alcohol/week: 0.0 standard drinks     Comment: rare   • Drug use: No     Family History   Problem Relation Age of Onset   • Diabetes Mother    • Musculoskeletal Mother         back    • Cataracts Mother    • Retinal detachment Mother    • Arthritis Father    • Stroke/TIA Father    • Arthritis Sister    • Musculoskeletal Sister         degenerative disc disease    • Stroke/TIA Sister         epilepsy    • Obesity Sister    • Musculoskeletal Sister         back problems    • NEGATIVE FAMILY HX OF Daughter    • NEGATIVE FAMILY HX OF Son        Physical Exam:  Visit Vitals  /60   Pulse 78   Ht 6' 1\" (1.854 m)   Wt (!) 142.2 kg   SpO2 95%   BMI  41.37 kg/m²     General Appearance:  Well developed, well nourished, well appearing male in no acute distress talking in full sentences.    Neck: Supple, trachea midline with no anterior, posterior, supraclavicular or submandibular lymphadenopathy. No palpable thyromegaly. No carotid bruit or jugular venous distention.  Respiratory:  Lungs are clear to auscultation bilaterally throughout all lobes. No wheezing, rhonchi, or crackles. No stridor. No increased work of breathing, retractions or accessory muscle use.   Cardiovascular:  Normal rate and rhythm. Normal S1 and S2. No S3-S4, murmur, gallop, click or rub. No clubbing or cyanosis appreciated. Capillary refill time less than 2 seconds. Bilateral radial, posterior tibial and dorsalis pedis pulses normal.      Integument:  Well hydrated with normal skin turgor, no rashes or lesions.     Neurologic:  Alert & oriented x 3, Normal smooth coordinated gait. No focal deficits noted.  Foot exam-No skin breakdown feet.     Normal Bilateral Foot Exam:  Skin integrity is normal. Dorsalis pedis and posterior tibial pulses are present.  Pressure sensation using the [place]-Nel monofilament is present.    Psychiatric:  Affect normal. Speech is normal and non-pressured. Memory, cognition, and judgment appear intact. Behavior appropriate.    LABORATORY DATA:    Lab Results   Component Value Date    SODIUM 139 04/12/2021    POTASSIUM 3.7 04/12/2021    CHLORIDE 103 04/12/2021    CO2 26 04/12/2021    BUN 16 04/12/2021    CREATININE 1.02 04/12/2021    GLUCOSE 154 (H) 04/12/2021    HGBA1C 10.3 (H) 08/21/2020    URMIC 1.53 09/28/2018    .00 08/21/2020    MALBCR 12.9 08/21/2020     Cholesterol (mg/dL)   Date Value   08/21/2020 178     HDL (mg/dL)   Date Value   08/21/2020 36 (L)     LDL (mg/dL)   Date Value   08/21/2020 124     Triglycerides (mg/dL)   Date Value   08/21/2020 90            Health Maintenance   Topic Date Due   • Diabetes Eye Exam  10/08/2019   • Shingles  nontoxic, ingestion was over 6 hours PTA, pt is asymptomatic, pt is medically cleared for psych evaluation nontoxic, ingestion was over 6 hours PTA, pt is asymptomatic, pt is medically cleared for psych evaluation  dr nguyen updated medical clearance by dr tafoya , psych has made me aware that pt is cleared for d/ with apporpiate follow up for her psych treatments

## 2022-02-16 NOTE — ED ADULT NURSE NOTE - CADM POA CENTRAL LINE
Ivelisse Christy)   No

## 2022-03-08 NOTE — ED ADULT TRIAGE NOTE - HEIGHT IN FEET
Appears nontoxic but does not appear well  T99  Advise eval for UTI/pyelo also COVID  Sending to L&D now  Report sent via TT to Brian Langley, charge RN and resident  5

## 2022-08-18 NOTE — ED PROVIDER NOTE - CPE EDP SKIN NORM
8/18/2022      Chief Complaint   Patient presents with    Benign Prostatic Hypertrophy    Erectile Dysfunction         Assessment and Plan    78 y o  male managed by Dr Aurelio Minor    1  BPH  2  Erectile Dysfunction  3  Anorgasmia    He feels good on the Flomax from urinary standpoint and wishes to continue  He is having rigid erections and able to penetrate just does not last very long before goes limp  I do not know why he has decreased sensation and anorgasmia  He takes no neuro or ssri type medications  He has no ejaculate probably exacerbated by the Flomax as it is known to cause retrograde ejaculation  He wishes to try alternate ED medication  I offered low daily dose cialis 5mg each day he wants to try this  He can take an extra 5mg tablet an hour before sex if needed  Followup 3-6 months, requests [place] office  History of Present Illness  Jarrod Ramirez is a 78 y o  male here for evaluation of three-month follow-up BPH and erectile dysfunction  He had COVID in December of 2020  Has had notable rectal dysfunction since that time  Prior to that he had excellent rigidity  He has prescription of viagra and cialis within the past year up to 20mg cialis PRN which he reports did not help at all  Complains of nocturia and was prescribed Flomax at his initial visit back in May  Reports improved stream and frequency but still wakes 3x per night  He is most bothered by the erectile changes  On further questioning he is actually having rigid erections in having penetrative intercourse as recently as last week but states it only lasts to a 3 minutes before goes limp  He states he has no sensation or orgasm at all  And he has no ejaculate fluid for at least the 18 months, far preceding his starting the Flomax      Lab Results   Component Value Date    PSA 2 5 08/16/2022    PSA 2 2 07/27/2021    PSA 1 5 01/23/2020         Review of Systems   Constitutional: Negative for activity change, appetite change, chills, fever and unexpected weight change  HENT: Negative  Respiratory: Negative  Negative for shortness of breath  Cardiovascular: Negative  Negative for chest pain  Gastrointestinal: Negative for abdominal pain, diarrhea, nausea and vomiting  Endocrine: Negative  Genitourinary: Negative for decreased urine volume, difficulty urinating, dysuria, flank pain, frequency, hematuria, scrotal swelling, testicular pain and urgency  Musculoskeletal: Negative for back pain and gait problem  Skin: Negative  Allergic/Immunologic: Negative  Neurological: Negative  Hematological: Negative for adenopathy  Does not bruise/bleed easily  AUA SYMPTOM SCORE    Flowsheet Row Most Recent Value   AUA SYMPTOM SCORE    How often have you had a sensation of not emptying your bladder completely after you finished urinating? 2 (P)     How often have you had to urinate again less than two hours after you finished urinating? 2 (P)     How often have you found you stopped and started again several times when you urinate? 1 (P)     How often have you found it difficult to postpone urination? 1 (P)     How often have you had a weak urinary stream? 1 (P)     How often have you had to push or strain to begin urination? 3 (P)     How many times did you most typically get up to urinate from the time you went to bed at night until the time you got up in the morning? 5 (P)     Quality of Life: If you were to spend the rest of your life with your urinary condition just the way it is now, how would you feel about that? 4 (P)     AUA SYMPTOM SCORE 15 (P)            Vitals  Vitals:    08/18/22 1330   BP: 118/80   Pulse: 80   Weight: 104 kg (229 lb)   Height: 5' 11" (1 803 m)       Physical Exam  Vitals and nursing note reviewed  Constitutional:       General: He is not in acute distress  Appearance: Normal appearance  He is well-developed  He is not diaphoretic  HENT:      Head: Normocephalic and atraumatic  Pulmonary:      Effort: Pulmonary effort is normal       Comments: No cough or audible wheeze  Abdominal:      General: There is no distension  Tenderness: There is no abdominal tenderness  There is no right CVA tenderness or left CVA tenderness  Musculoskeletal:      Right lower leg: No edema  Left lower leg: No edema  Skin:     General: Skin is warm and dry  Neurological:      Mental Status: He is alert and oriented to person, place, and time        Gait: Gait normal    Psychiatric:         Speech: Speech normal          Behavior: Behavior normal            Past History  Past Medical History:   Diagnosis Date    Allergic rhinitis     Arthritis     Asthma     COVID-19 2020    Heart attack [facility] )     Hypertensive disorder 7/15/2013    Myocardial infarction ([facility] )     2012    Sleep apnea     does not use C-PAP     Social History     Socioeconomic History    Marital status: /Civil Union     Spouse name: None    Number of children: None    Years of education: None    Highest education level: None   Occupational History    None   Tobacco Use    Smoking status: Former Smoker     Packs/day: 0 50     Years: 6 00     Pack years: 3 00     Types: Cigarettes     Quit date:      Years since quittin 6    Smokeless tobacco: Former User   Vaping Use    Vaping Use: Never used   Substance and Sexual Activity    Alcohol use: Not Currently    Drug use: Never    Sexual activity: None   Other Topics Concern    None   Social History Narrative    Daily caffeine use - 1 cup coffee     Social Determinants of Health     Financial Resource Strain: Not on file   Food Insecurity: Not on file   Transportation Needs: Not on file   Physical Activity: Not on file   Stress: Not on file   Social Connections: Not on file   Intimate Partner Violence: Not on file   Housing Stability: Not on file     Social History     Tobacco Use   Smoking Status Former Smoker    Packs/day: 0 50    Years: 6 00    Pack years: 3 00    Types: Cigarettes    Quit date:     Years since quittin 6   Smokeless Tobacco Former User     Family History   Problem Relation Age of Onset    Cancer Brother     Melanoma Brother        The following portions of the patient's history were reviewed and updated as appropriate: allergies, current medications, past medical history, past social history, past surgical history and problem list     Results  No results found for this or any previous visit (from the past 1 hour(s)) ]  Lab Results   Component Value Date    PSA 2 5 2022    PSA 2 2 2021    PSA 1 5 2020     Lab Results   Component Value Date    CALCIUM 8 9 2022    K 4 2 2022    CO2 25 2022     (H) 2022    BUN 11 2022    CREATININE 1 04 2022     Lab Results   Component Value Date    WBC 4 63 2022    HGB 15 5 2022    HCT 47 1 2022    MCV 90 2022     2022 normal...

## 2023-02-23 ENCOUNTER — EMERGENCY (EMERGENCY)
Facility: HOSPITAL | Age: 38
LOS: 1 days | Discharge: HOME | End: 2023-02-23
Attending: STUDENT IN AN ORGANIZED HEALTH CARE EDUCATION/TRAINING PROGRAM
Payer: COMMERCIAL

## 2023-02-23 VITALS
HEART RATE: 121 BPM | SYSTOLIC BLOOD PRESSURE: 140 MMHG | WEIGHT: 194.01 LBS | DIASTOLIC BLOOD PRESSURE: 67 MMHG | RESPIRATION RATE: 22 BRPM | OXYGEN SATURATION: 100 % | HEIGHT: 64 IN | TEMPERATURE: 98 F

## 2023-02-23 DIAGNOSIS — Z98.890 OTHER SPECIFIED POSTPROCEDURAL STATES: Chronic | ICD-10-CM

## 2023-02-23 DIAGNOSIS — Z98.891 HISTORY OF UTERINE SCAR FROM PREVIOUS SURGERY: Chronic | ICD-10-CM

## 2023-02-23 LAB
BASOPHILS # BLD AUTO: 0.06 K/UL — SIGNIFICANT CHANGE UP (ref 0–0.2)
BASOPHILS NFR BLD AUTO: 0.4 % — SIGNIFICANT CHANGE UP (ref 0–2)
EOSINOPHIL # BLD AUTO: 0.07 K/UL — SIGNIFICANT CHANGE UP (ref 0–0.5)
EOSINOPHIL NFR BLD AUTO: 0.5 % — SIGNIFICANT CHANGE UP (ref 0–6)
HCT VFR BLD CALC: 39.5 % — SIGNIFICANT CHANGE UP (ref 34.5–45)
HGB BLD-MCNC: 12.8 G/DL — SIGNIFICANT CHANGE UP (ref 11.5–15.5)
IMM GRANULOCYTES NFR BLD AUTO: 0.7 % — SIGNIFICANT CHANGE UP (ref 0–0.9)
LYMPHOCYTES # BLD AUTO: 1.55 K/UL — SIGNIFICANT CHANGE UP (ref 1–3.3)
LYMPHOCYTES # BLD AUTO: 10.2 % — LOW (ref 13–44)
MCHC RBC-ENTMCNC: 25.9 PG — LOW (ref 27–34)
MCHC RBC-ENTMCNC: 32.4 GM/DL — SIGNIFICANT CHANGE UP (ref 32–36)
MCV RBC AUTO: 79.8 FL — LOW (ref 80–100)
MONOCYTES # BLD AUTO: 1.1 K/UL — HIGH (ref 0–0.9)
MONOCYTES NFR BLD AUTO: 7.3 % — SIGNIFICANT CHANGE UP (ref 2–14)
NEUTROPHILS # BLD AUTO: 12.25 K/UL — HIGH (ref 1.8–7.4)
NEUTROPHILS NFR BLD AUTO: 80.9 % — HIGH (ref 43–77)
PLATELET # BLD AUTO: 283 K/UL — SIGNIFICANT CHANGE UP (ref 150–400)
RBC # BLD: 4.95 M/UL — SIGNIFICANT CHANGE UP (ref 3.8–5.2)
RBC # FLD: 13.6 % — SIGNIFICANT CHANGE UP (ref 10.3–14.5)
WBC # BLD: 15.13 K/UL — HIGH (ref 3.8–10.5)
WBC # FLD AUTO: 15.13 K/UL — HIGH (ref 3.8–10.5)

## 2023-02-23 PROCEDURE — 99285 EMERGENCY DEPT VISIT HI MDM: CPT

## 2023-02-23 RX ORDER — ONDANSETRON 8 MG/1
4 TABLET, FILM COATED ORAL ONCE
Refills: 0 | Status: COMPLETED | OUTPATIENT
Start: 2023-02-23 | End: 2023-02-24

## 2023-02-23 RX ORDER — MORPHINE SULFATE 50 MG/1
4 CAPSULE, EXTENDED RELEASE ORAL ONCE
Refills: 0 | Status: DISCONTINUED | OUTPATIENT
Start: 2023-02-23 | End: 2023-02-23

## 2023-02-23 RX ORDER — SODIUM CHLORIDE 9 MG/ML
1000 INJECTION INTRAMUSCULAR; INTRAVENOUS; SUBCUTANEOUS ONCE
Refills: 0 | Status: COMPLETED | OUTPATIENT
Start: 2023-02-23 | End: 2023-02-23

## 2023-02-23 RX ADMIN — MORPHINE SULFATE 4 MILLIGRAM(S): 50 CAPSULE, EXTENDED RELEASE ORAL at 23:42

## 2023-02-23 RX ADMIN — SODIUM CHLORIDE 1000 MILLILITER(S): 9 INJECTION INTRAMUSCULAR; INTRAVENOUS; SUBCUTANEOUS at 23:44

## 2023-02-23 NOTE — ED PROVIDER NOTE - NSFOLLOWUPINSTRUCTIONS_ED_ALL_ED_FT
-A prescription for 2 antibiotics was sent to your pharmacy - please take all doses even if you feel completely better   -Your CT scan also showed a nodule near your kidney (in your adrenal gland), please discuss this with your primary care physician and bring these lab/CT results with you  -It is safe to take tylenol and/or motrin if needed for pain  -Return with any new/worse/concerning symptoms     Diverticulitis    Diverticulitis is inflammation or infection of small pouches in your colon that form when you HAVE a condition called diverticulosis. This condition can range from mild to severe potentially leading to perforation or obstructions of your colon. Symptoms include abdominal pain, fever/chills, nausea, vomiting, diarrhea, constipation, or blood in your stool. If you were prescribed an antibiotic medicine, take it as told by your health care provider. Do not stop taking the antibiotic even if you start to feel better.    SEEK IMMEDIATE MEDICAL CARE IF YOU HAVE ANY OF THE FOLLOWING SYMPTOMS: worsening abdominal pain, high fever, inability to hold down liquids or medication, black or bloody stools, inability to pass gas, lightheadedness/dizziness, or a change in mental status.

## 2023-02-23 NOTE — ED PROVIDER NOTE - ATTENDING CONTRIBUTION TO CARE
Pt states that at 3 AM she had onset of L-sided abdominal pain. Pt states that initially it was mild but gradually worsened throughout the day and is now severe. +nausea. no vomiting. no diarrhea. no fever/chills. no cp. no sob.     physical - rrr. ctab. abd - soft, L-sided abd ttp. no rebound. no guarding. no edema. no rash.    plan - labs and imaging reviewed. Pt with acute uncomplicated diverticulitis. Pt feeling better. will start po abx. Pt referred to gi. isntructed to return for worsening/persistent pain, fever, or any other concerns.

## 2023-02-23 NOTE — ED ADULT TRIAGE NOTE - CHIEF COMPLAINT QUOTE
patient states sudden onset lower and left sided abdominal pain since 3am this morning. states having orange stools. hx of diverticulitis and a blocked bile ducts.

## 2023-02-23 NOTE — ED PROVIDER NOTE - PHYSICAL EXAMINATION
General: lying still in bed, NAD   Head: NC, AT  EENT: EOMI, no scleral icterus  Cardiac: tachycardic, no apparent murmurs  Respiratory: no respiratory distress   Abdomen: soft, ND, left sided ttp   MSK/Vascular: full ROM, soft compartments, warm extremities  Neuro: AAOx3, sensation to light touch intact  Psych: calm, cooperative
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
## 2023-02-23 NOTE — ED PROVIDER NOTE - CLINICAL SUMMARY MEDICAL DECISION MAKING FREE TEXT BOX
37 year old female with hx of cholecystectomy with bile duct revision, diverticulitis, pancreatitis, ruptured ectopic who presents with abdominal pain.  *** 37 year old female with hx of cholecystectomy with bile duct revision, diverticulitis, pancreatitis, ruptured ectopic who presents with abdominal pain. Labs with leukocytosis. CT Abd/Pelvis w acute diverticulitis and 1.4 cm adrenal nodule. Pt tolerated PO and pain resolved after 1 dose of morphine/1 dose tylenol during ED stay. Pt will be discharged with Rxs for cipro/flagyl

## 2023-02-23 NOTE — ED PROVIDER NOTE - CHIEF COMPLAINT
Spoke with Lizzette/Home Health Nurse [phone]. Gave verbal per Dr. Frank for PT.    The patient is a 37y Female complaining of abdominal pain.

## 2023-02-23 NOTE — ED PROVIDER NOTE - OBJECTIVE STATEMENT
37 year old female with hx of cholecystectomy with bile duct revision 37 year old female with hx of cholecystectomy with bile duct revision, diverticulitis, pancreatitis, ruptured ectopic who presents with abdominal pain. At 03:00 (on 2/23) patient woke up with severe 'cramping" left sided abdominal pain. Took tums without relief. This morning at 07:30 she had a normal BM. States that her pain has worsened throughout the day and is worse with any movement. Had temp of 99.8 at home. Reports chills. No fever, vomiting, or diarrhea. Her menses ended yesterday.

## 2023-02-23 NOTE — ED PROVIDER NOTE - PATIENT PORTAL LINK FT
You can access the FollowMyHealth Patient Portal offered by [facility] by registering at the following website: [email or web address]. By joining [facility]’s FollowMyHealth portal, you will also be able to view your health information using other applications (apps) compatible with our system.

## 2023-02-23 NOTE — ED PROVIDER NOTE - PROGRESS NOTE DETAILS
Glenn: WBC elevated, other labs and CT pending Glenn: I discussed CT results with patient. Rxs sent for cipro/flagyl. She tolerated ice chips and later ginger ale.

## 2023-02-23 NOTE — ED PROVIDER NOTE - NSICDXPASTSURGICALHX_GEN_ALL_CORE_FT
PAST SURGICAL HISTORY:  H/O  section     History of common bile duct surgery     History of laparoscopic cholecystectomy

## 2023-02-23 NOTE — ED PROVIDER NOTE - NS ED ROS FT
Constitutional: no fever, + chills  Head: NC, AT   Eyes: no redness   ENMT: no nasal congestion/drainage, no sore throat, +post nasal drip    CV: no chest pain, no edema  Resp: no cough, no dyspnea  GI: +abdominal pain, no nausea, no vomiting, no diarrhea  : no dysuria, no hematuria   Skin: no lesions, no rashes   Neuro: no LOC, no headache, no sensory deficits, no weakness

## 2023-02-24 VITALS
SYSTOLIC BLOOD PRESSURE: 109 MMHG | DIASTOLIC BLOOD PRESSURE: 71 MMHG | RESPIRATION RATE: 20 BRPM | TEMPERATURE: 98 F | OXYGEN SATURATION: 97 % | HEART RATE: 88 BPM

## 2023-02-24 LAB
ALBUMIN SERPL ELPH-MCNC: 3.8 G/DL — SIGNIFICANT CHANGE UP (ref 3.3–5.2)
ALP SERPL-CCNC: 83 U/L — SIGNIFICANT CHANGE UP (ref 40–120)
ALT FLD-CCNC: 12 U/L — SIGNIFICANT CHANGE UP
ANION GAP SERPL CALC-SCNC: 12 MMOL/L — SIGNIFICANT CHANGE UP (ref 5–17)
AST SERPL-CCNC: 15 U/L — SIGNIFICANT CHANGE UP
BILIRUB SERPL-MCNC: 0.6 MG/DL — SIGNIFICANT CHANGE UP (ref 0.4–2)
BUN SERPL-MCNC: 9.3 MG/DL — SIGNIFICANT CHANGE UP (ref 8–20)
CALCIUM SERPL-MCNC: 8.7 MG/DL — SIGNIFICANT CHANGE UP (ref 8.4–10.5)
CHLORIDE SERPL-SCNC: 101 MMOL/L — SIGNIFICANT CHANGE UP (ref 96–108)
CO2 SERPL-SCNC: 24 MMOL/L — SIGNIFICANT CHANGE UP (ref 22–29)
CREAT SERPL-MCNC: 0.7 MG/DL — SIGNIFICANT CHANGE UP (ref 0.5–1.3)
EGFR: 113 ML/MIN/1.73M2 — SIGNIFICANT CHANGE UP
GLUCOSE SERPL-MCNC: 102 MG/DL — HIGH (ref 70–99)
HCG SERPL-ACNC: <4 MIU/ML — SIGNIFICANT CHANGE UP
LIDOCAIN IGE QN: 16 U/L — LOW (ref 22–51)
POTASSIUM SERPL-MCNC: 4 MMOL/L — SIGNIFICANT CHANGE UP (ref 3.5–5.3)
POTASSIUM SERPL-SCNC: 4 MMOL/L — SIGNIFICANT CHANGE UP (ref 3.5–5.3)
PROT SERPL-MCNC: 6.8 G/DL — SIGNIFICANT CHANGE UP (ref 6.6–8.7)
SODIUM SERPL-SCNC: 137 MMOL/L — SIGNIFICANT CHANGE UP (ref 135–145)

## 2023-02-24 PROCEDURE — 83690 ASSAY OF LIPASE: CPT

## 2023-02-24 PROCEDURE — 74177 CT ABD & PELVIS W/CONTRAST: CPT | Mod: MG

## 2023-02-24 PROCEDURE — 85025 COMPLETE CBC W/AUTO DIFF WBC: CPT

## 2023-02-24 PROCEDURE — 80053 COMPREHEN METABOLIC PANEL: CPT

## 2023-02-24 PROCEDURE — 36415 COLL VENOUS BLD VENIPUNCTURE: CPT

## 2023-02-24 PROCEDURE — 96361 HYDRATE IV INFUSION ADD-ON: CPT

## 2023-02-24 PROCEDURE — 74177 CT ABD & PELVIS W/CONTRAST: CPT | Mod: 26,MG

## 2023-02-24 PROCEDURE — 99284 EMERGENCY DEPT VISIT MOD MDM: CPT | Mod: 25

## 2023-02-24 PROCEDURE — 96375 TX/PRO/DX INJ NEW DRUG ADDON: CPT

## 2023-02-24 PROCEDURE — 96374 THER/PROPH/DIAG INJ IV PUSH: CPT

## 2023-02-24 PROCEDURE — G1004: CPT

## 2023-02-24 PROCEDURE — 84702 CHORIONIC GONADOTROPIN TEST: CPT

## 2023-02-24 RX ORDER — METRONIDAZOLE 500 MG
500 TABLET ORAL ONCE
Refills: 0 | Status: COMPLETED | OUTPATIENT
Start: 2023-02-24 | End: 2023-02-24

## 2023-02-24 RX ORDER — CIPROFLOXACIN LACTATE 400MG/40ML
500 VIAL (ML) INTRAVENOUS ONCE
Refills: 0 | Status: COMPLETED | OUTPATIENT
Start: 2023-02-24 | End: 2023-02-24

## 2023-02-24 RX ORDER — CIPROFLOXACIN LACTATE 400MG/40ML
1 VIAL (ML) INTRAVENOUS
Qty: 14 | Refills: 0
Start: 2023-02-24 | End: 2023-03-02

## 2023-02-24 RX ORDER — ACETAMINOPHEN 500 MG
975 TABLET ORAL ONCE
Refills: 0 | Status: COMPLETED | OUTPATIENT
Start: 2023-02-24 | End: 2023-02-24

## 2023-02-24 RX ORDER — METRONIDAZOLE 500 MG
1 TABLET ORAL
Qty: 21 | Refills: 0
Start: 2023-02-24 | End: 2023-03-02

## 2023-02-24 RX ADMIN — Medication 500 MILLIGRAM(S): at 04:45

## 2023-02-24 RX ADMIN — Medication 975 MILLIGRAM(S): at 04:11

## 2023-02-24 RX ADMIN — ONDANSETRON 4 MILLIGRAM(S): 8 TABLET, FILM COATED ORAL at 01:15

## 2023-02-24 RX ADMIN — MORPHINE SULFATE 4 MILLIGRAM(S): 50 CAPSULE, EXTENDED RELEASE ORAL at 00:42

## 2023-02-24 RX ADMIN — Medication 975 MILLIGRAM(S): at 03:11

## 2023-02-24 NOTE — ED ADULT NURSE NOTE - OBJECTIVE STATEMENT
AxOx4. Patient c/o sudden onset lower and left sided abdominal pain since 3am this morning. Patient states having orange stools and states she has hx of diverticulitis and a blocked bile ducts. Abd soft, tender, non distended. IV placed, labs sent, medicated as per orders.

## 2024-01-01 NOTE — ED ADULT TRIAGE NOTE - MODE OF ARRIVAL
Bed: 12  Expected date:   Expected time:   Means of arrival:   Comments:  RL- wheezing   Private Vehicle

## 2024-02-28 NOTE — ED ADULT TRIAGE NOTE - NS ED TRIAGE AVPU SCALE
Negative Alert-The patient is alert, awake and responds to voice. The patient is oriented to time, place, and person. The triage nurse is able to obtain subjective information.

## 2024-08-28 NOTE — ED ADULT TRIAGE NOTE - BSA (M2)
I will give her enough to get into her new pcp. I am not taking over prescription of this medication   
Provider:  Galo  Surgery/Procedure:  Lumbar discectomy Right L5-S1  Surgery/Procedure Date:  8/22/24  Last visit:   7/2/24  Next visit: NA     Reason for call:  Spoke to patient spouse, Nicol Ruiz, she reports Ms. Preciado is in need of refills of methocarbamol and gabapentin. Her PCP had been prescribing these medications, however she had missed or rescheduled too many appointments and was discharged from the practice this week - she does have a new PCP appointment on 9/4/24, but is requesting a refill on these medications until she can be seen.    Javi:    1 08/23/2024 [id number] Oxycodone/Acetaminophen   325MG/7.5MG  [facility] 15.00 5 34 02 [place]  [facility]  1 08/01/2024 [id number] Gabapentin 800MG [place], [place] [email or web address] 90.00 30 02 [place]  New 08/01/2024 [facility]  1 07/02/2024 [id number] Gabapentin 800MG Burnett, [place]ma [email or web address] 90.00 30 02 [place]  New 07/02/2024 [facility]  1 06/05/2024 [id number] Gabapentin 800MG Jes [email or web address] [email or web address] 90.00 30 02 [place]  New 06/05/2024 [facility]  
1.58